# Patient Record
Sex: MALE | Race: WHITE | Employment: OTHER | URBAN - METROPOLITAN AREA
[De-identification: names, ages, dates, MRNs, and addresses within clinical notes are randomized per-mention and may not be internally consistent; named-entity substitution may affect disease eponyms.]

---

## 2017-08-08 ENCOUNTER — HOSPITAL ENCOUNTER (OUTPATIENT)
Dept: CT IMAGING | Age: 82
Discharge: HOME | End: 2017-08-08
Attending: NURSE PRACTITIONER
Payer: MEDICARE

## 2017-08-08 DIAGNOSIS — R10.12 ABDOMINAL PAIN, LEFT UPPER QUADRANT: ICD-10-CM

## 2017-08-08 DIAGNOSIS — R53.83 FATIGUE: ICD-10-CM

## 2017-08-08 LAB — CREAT BLD-MCNC: 1.2 MG/DL (ref 0.6–1.3)

## 2017-08-08 PROCEDURE — 74160 CT ABDOMEN W/CONTRAST: CPT

## 2017-08-08 PROCEDURE — 74011636320 HC RX REV CODE- 636/320: Performed by: RADIOLOGY

## 2017-08-08 PROCEDURE — 82565 ASSAY OF CREATININE: CPT

## 2017-08-08 PROCEDURE — 74011250636 HC RX REV CODE- 250/636: Performed by: RADIOLOGY

## 2017-08-08 RX ORDER — SODIUM CHLORIDE 9 MG/ML
50 INJECTION, SOLUTION INTRAVENOUS
Status: COMPLETED | OUTPATIENT
Start: 2017-08-08 | End: 2017-08-08

## 2017-08-08 RX ORDER — SODIUM CHLORIDE 0.9 % (FLUSH) 0.9 %
10 SYRINGE (ML) INJECTION
Status: COMPLETED | OUTPATIENT
Start: 2017-08-08 | End: 2017-08-08

## 2017-08-08 RX ADMIN — Medication 10 ML: at 14:26

## 2017-08-08 RX ADMIN — IOPAMIDOL 100 ML: 755 INJECTION, SOLUTION INTRAVENOUS at 14:26

## 2017-08-08 RX ADMIN — SODIUM CHLORIDE 50 ML/HR: 900 INJECTION, SOLUTION INTRAVENOUS at 14:26

## 2018-12-05 ENCOUNTER — OFFICE VISIT (OUTPATIENT)
Dept: NEUROLOGY | Age: 83
End: 2018-12-05

## 2018-12-05 VITALS
SYSTOLIC BLOOD PRESSURE: 142 MMHG | HEIGHT: 70 IN | DIASTOLIC BLOOD PRESSURE: 78 MMHG | OXYGEN SATURATION: 98 % | WEIGHT: 200 LBS | HEART RATE: 68 BPM | BODY MASS INDEX: 28.63 KG/M2 | RESPIRATION RATE: 18 BRPM

## 2018-12-05 DIAGNOSIS — G95.19 NEUROGENIC CLAUDICATION (HCC): Primary | ICD-10-CM

## 2018-12-05 DIAGNOSIS — Z87.81 HISTORY OF COMPRESSION FRACTURE OF SPINE: ICD-10-CM

## 2018-12-05 DIAGNOSIS — R20.2 TINGLING OF BOTH UPPER EXTREMITIES: ICD-10-CM

## 2018-12-05 RX ORDER — FINASTERIDE 5 MG/1
5 TABLET, FILM COATED ORAL DAILY
Refills: 0 | COMMUNITY
Start: 2018-10-01 | End: 2019-06-19

## 2018-12-05 RX ORDER — SILODOSIN 8 MG/1
8 CAPSULE ORAL
Refills: 0 | COMMUNITY
Start: 2018-10-01 | End: 2019-10-17

## 2018-12-05 NOTE — LETTER
12/5/2018 Patient:  Sotero Martinez YOB: 1934 Date of Visit: 12/5/2018 Dear Bryson Joseph MD 
[address] [place] [address] VIA Facsimile: [phone] Josh Eron I was requested by Wendie Webster MD to evaluate Mr. Sotero Martinez  for Chief Complaint Patient presents with  Extremity Weakness  
  both legs Ana Hudson I am recommending the following:  
 
Diagnoses and all orders for this visit: 1. Neurogenic claudication -     MRI LUMB SPINE WO CONT; Future 2. Tingling of both upper extremities -     MRI CERV SPINE WO CONT; Future -     MRI [facility] SPINE WO CONT; Future 3. History of compression fracture of spine -     MRI CERV SPINE WO CONT; Future -     MRI [facility] SPINE WO CONT; Future -     MRI LUMB SPINE WO CONT; Future 
 
 
 
---------------------------------------------------------------------------------------------------------------------- Below is my encounter: Chief Complaint Patient presents with  Extremity Weakness  
  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 He tells me that as far back as 2012 he has had some episodes of bilateral leg weakness and falls subsequently. His last event was last week. He went on a long 2 mi walk and about senior care through he began to have progressive heaviness of both legs symmetrically without numbness. Eventually he lost strength and fell. He needed help. NO arm weakness. This  Has happened maybe 5 times. In 2012 he had a fall while handling his dog and actually fell backwards and developed a T3 fracture that did not need intervention. He has some mild low back pain but no radicular symptoms. Complaints of upper extremity numbness and tingling distally of both hands. No vision changes or weakness.   X-rays of the T-spine and L-spine with abnormality in 2012. He walks his dog daily and does yoga. Review of Systems Constitutional: Positive for malaise/fatigue. Musculoskeletal: Positive for back pain, falls and neck pain. Neurological: Positive for tingling and focal weakness. Negative for sensory change and speech change. All other systems reviewed and are negative. Past Medical History:  
Diagnosis Date  BPH (benign prostatic hyperplasia)  Compression fracture of thoracic vertebra (HCC) T3  
 ED (erectile dysfunction)  GERD (gastroesophageal reflux disease)  Hiatal hernia  Hypersomnia  Osteopenia  Schatzki's ring  Sun-damaged skin  Sunburn, blistering Family History Problem Relation Age of Onset  Heart Disease Father  Heart Attack Father  Liver Disease Mother Social History Socioeconomic History  Marital status:  Spouse name: Not on file  Number of children: Not on file  Years of education: Not on file  Highest education level: Not on file Social Needs  Financial resource strain: Not on file  Food insecurity - worry: Not on file  Food insecurity - inability: Not on file  Transportation needs - medical: Not on file  Transportation needs - non-medical: Not on file Occupational History  Not on file Tobacco Use  Smoking status: Former Smoker Last attempt to quit: 1980 Years since quittin.9  Smokeless tobacco: Never Used Substance and Sexual Activity  Alcohol use: Yes Alcohol/week: 4.2 oz Types: 7 Shots of liquor per week  Drug use: Not on file  Sexual activity: Not on file Other Topics Concern  Not on file Social History Narrative  Not on file Current Outpatient Medications Medication Sig  
 finasteride (PROSCAR) 5 mg tablet  RAPAFLO 8 mg capsule  multivitamin (ONE A DAY) tablet Take 1 Tab by mouth daily.  GABAPENTIN PO Take  by mouth.  dutasteride (AVODART) 0.5 mg capsule Take 0.5 mg by mouth daily.  bupivacaine-EPINEPHrine (MARCAINE-EPINEPHRINE) 0.25 %-1:200,000 soln Used for Mohs surgery.  OTHER Pt cant remember name of medication prescribed for urinary difficulties, pt states it is like flomax.  carisoprodol (SOMA) 350 mg tablet Take 1 Tab by mouth four (4) times daily. No current facility-administered medications for this visit. Allergies Allergen Reactions  Bactrim [Sulfamethoprim Ds] Rash Neurologic Exam  
 
Mental Status Oriented to person, place, and time. Cranial Nerves Cranial nerves II through XII intact. Motor Exam  
Muscle bulk: normal 
 
Strength Strength 5/5 throughout. No atrophy or fasciculations Sensory Exam  
Light touch normal.  
 
Gait, Coordination, and Reflexes Gait Gait: normal 
 
Coordination Romberg: negative Tremor Resting tremor: absentUpper extremities 3/4 Lower extremities patella 2/4, Achilles 1/4, mute toes Physical Exam  
Constitutional: He is oriented to person, place, and time. He appears well-developed and well-nourished. Cardiovascular: Normal rate. Pulmonary/Chest: Effort normal.  
Neurological: He is oriented to person, place, and time. He has normal strength. He has a normal Romberg Test. Gait normal.  
Skin: Skin is warm and dry. Psychiatric: He has a normal mood and affect. His behavior is normal.  
Vitals reviewed. Visit Vitals /78 Pulse 68 Resp 18 Ht 5' 10\" (1.778 m) Wt 90.7 kg (200 lb) SpO2 98% BMI 28.70 kg/m² No results found for: WBC, WBCT, WBCPOC, HGB, HGBPOC, HCT, HCTPOC, PLT, PLTPOC, MCV, MCVPOC, HGBEXT, HCTEXT, PLTEXT No results found for: GPT, ALTPOC, ALT, SGOT, ASTPOC, GGT, AP, APIT, APX, CBIL, TBIL, TBILI, ALB, ALBPOC, TP, NH3, NH4, INR, PTP, PTINR, PTEXT, PLT, PLTPOC, HCABQL, HBSAG, AFP, PTEXT, PLTEXT 
  
 
CT Results (maximum last 3): Results from Hospital Encounter encounter on 08/08/17 CT ABD W CONT Narrative EXAM:  CT ABD W CONT INDICATION:   Abdominal pain, left upper quadrant,, Fatigue COMPARISON: None. CONTRAST:  100 mL of Isovue-370. TECHNIQUE:  
Multislice helical CT was performed from the diaphragm to the iliac crest during 
uneventful rapid bolus intravenous contrast administration. Oral contrast was 
administered. Contiguous 5 mm axial images were reconstructed and lung and soft 
tissue windows were generated. Coronal and sagittal reformations were generated. CT dose reduction was achieved through use of a standardized protocol tailored 
for this examination and automatic exposure control for dose modulation. FINDINGS: 
Liver and spleen are normal in size. The gallbladder is not distended, the 
pancreas appears unremarkable. Adrenals appear normal. Kidneys are unobstructed 
with some focal atrophy on the right and a cyst on the left. No definite 
calcifications. There is no bowel wall thickening or obstruction. Minimal 
calcification of the abdominal aorta. No free air or free fluid. The appendix 
appears normal. 
  
 Impression  impression: No significant abnormalities. MRI Results (maximum last 3): No results found for this or any previous visit. PET Results (maximum last 3): No results found for this or any previous visit. Assessment and Plan Diagnoses and all orders for this visit: 1. Neurogenic claudication -     MRI LUMB SPINE WO CONT; Future 2. Tingling of both upper extremities -     MRI CERV SPINE WO CONT; Future -     MRI [facility] SPINE WO CONT; Future 3. History of compression fracture of spine -     MRI CERV SPINE WO CONT; Future -     MRI [facility] SPINE WO CONT; Future -     MRI LUMB SPINE WO CONT; Future 80-year-old gentleman who has been having intermittent falls in the last few years preceded by a subacute progressive bilateral lower extremity weakness.   Based on the history of falls and abnormal x-rays I suspect this is likely peripheral.  It sounds like he has neurogenic claudication in his low back and after excessive walking in an upright posture with possibly some hyperextension of the low back he develops polyradicular symptoms. He does have a history of a T-spine fracture and resultant neck pain. I wonder if he has similar findings in the thoracic and cervical spine. On exam he is hyperreflexic in the upper extremities more than lower extremities with mute toes. I do think we need to conduct spine imaging throughout completely to r/o compressive process, etc.  I will order the studies. Depending on the results we may send him to a spine specialist for an opinion. Continue with activities as tolerated. Any unusual symptoms please let me know or come to the hospital. 
 
 
Thank you for giving me the opportunity to assist in the care of Mr. Jaspla Petty. If you have questions, please do not hesitate to contact me. Sincerely, 2 [facility], DO Neurologist 
Diplomate ENE

## 2018-12-05 NOTE — PATIENT INSTRUCTIONS
PRESCRIPTION REFILL POLICY [facility] Statement to Patients April 1, 2014 In an effort to ensure the large volume of patient prescription refills is processed in the most efficient and expeditious manner, we are asking our patients to assist us by calling your Pharmacy for all prescription refills, this will include also your  Mail Order Pharmacy. The pharmacy will contact our office electronically to continue the refill process. Please do not wait until the last minute to call your pharmacy. We need at least 48 hours (2days) to fill prescriptions. We also encourage you to call your pharmacy before going to  your prescription to make sure it is ready. With regard to controlled substance prescription refill requests (narcotic refills) that need to be picked up at our office, we ask your cooperation by providing us with at least 72 hours (3days) notice that you will need a refill. We will not refill narcotic prescription refill requests after 4:00pm on any weekday, Monday through Thursday, or after 2:00pm on Fridays, or on the weekends. We encourage everyone to explore another way of getting your prescription refill request processed using [employer], our patient web portal through our electronic medical record system. [employer] is an efficient and effective way to communicate your medication request directly to the office and  downloadable as an brayden on your smart phone . [employer] also features a review functionality that allows you to view your medication list as well as leave messages for your physician. Are you ready to get connected? If so please review the attatched instructions or speak to any of our staff to get you set up right away! Thank you so much for your cooperation. Should you have any questions please contact our Practice Administrator. The Physicians and Staff,  [facility] A Healthy Lifestyle: Care Instructions Your Care Instructions A healthy lifestyle can help you feel good, stay at a healthy weight, and have plenty of energy for both work and play. A healthy lifestyle is something you can share with your whole family. A healthy lifestyle also can lower your risk for serious health problems, such as high blood pressure, heart disease, and diabetes. You can follow a few steps listed below to improve your health and the health of your family. Follow-up care is a key part of your treatment and safety. Be sure to make and go to all appointments, and call your doctor if you are having problems. It's also a good idea to know your test results and keep a list of the medicines you take. How can you care for yourself at home? · Do not eat too much sugar, fat, or fast foods. You can still have dessert and treats now and then. The goal is moderation. · Start small to improve your eating habits. Pay attention to portion sizes, drink less juice and soda pop, and eat more fruits and vegetables. ? Eat a healthy amount of food. A 3-ounce serving of meat, for example, is about the size of a deck of cards. Fill the rest of your plate with vegetables and whole grains. ? Limit the amount of soda and sports drinks you have every day. Drink more water when you are thirsty. ? Eat at least 5 servings of fruits and vegetables every day. It may seem like a lot, but it is not hard to reach this goal. A serving or helping is 1 piece of fruit, 1 cup of vegetables, or 2 cups of leafy, raw vegetables. Have an apple or some carrot sticks as an afternoon snack instead of a candy bar. Try to have fruits and/or vegetables at every meal. 
· Make exercise part of your daily routine. You may want to start with simple activities, such as walking, bicycling, or slow swimming. Try to be active 30 to 60 minutes every day. You do not need to do all 30 to 60 minutes all at once.  For example, you can exercise 3 times a day for 10 or 20 minutes. Moderate exercise is safe for most people, but it is always a good idea to talk to your doctor before starting an exercise program. 
· Keep moving. Salvatore Memos the lawn, work in the garden, or [employer]. Take the stairs instead of the elevator at work. · If you smoke, quit. People who smoke have an increased risk for heart attack, stroke, cancer, and other lung illnesses. Quitting is hard, but there are ways to boost your chance of quitting tobacco for good. ? Use nicotine gum, patches, or lozenges. ? Ask your doctor about stop-smoking programs and medicines. ? Keep trying. In addition to reducing your risk of diseases in the future, you will notice some benefits soon after you stop using tobacco. If you have shortness of breath or asthma symptoms, they will likely get better within a few weeks after you quit. · Limit how much alcohol you drink. Moderate amounts of alcohol (up to 2 drinks a day for men, 1 drink a day for women) are okay. But drinking too much can lead to liver problems, high blood pressure, and other health problems. Family health If you have a family, there are many things you can do together to improve your health. · Eat meals together as a family as often as possible. · Eat healthy foods. This includes fruits, vegetables, lean meats and dairy, and whole grains. · Include your family in your fitness plan. Most people think of activities such as jogging or tennis as the way to fitness, but there are many ways you and your family can be more active. Anything that makes you breathe hard and gets your heart pumping is exercise. Here are some tips: 
? Walk to do errands or to take your child to school or the bus. 
? Go for a family bike ride after dinner instead of watching TV. Where can you learn more? Go to http://fernando-florencio.info/. Enter G944 in the search box to learn more about \"A Healthy Lifestyle: Care Instructions. \" Current as of: December 7, 2017 Content Version: 11.8 © 0482-5902 [employer]. Care instructions adapted under license by [employer] (which disclaims liability or warranty for this information). If you have questions about a medical condition or this instruction, always ask your healthcare professional. [address] any warranty or liability for your use of this information.

## 2018-12-05 NOTE — PROGRESS NOTES
Chief Complaint Patient presents with  Extremity Weakness  
  both legs Referred by: Dr. 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 He tells me that as far back as 2012 he has had some episodes of bilateral leg weakness and falls subsequently. His last event was last week. He went on a long 2 mi walk and about jail through he began to have progressive heaviness of both legs symmetrically without numbness. Eventually he lost strength and fell. He needed help. NO arm weakness. This  Has happened maybe 5 times. In 2012 he had a fall while handling his dog and actually fell backwards and developed a T3 fracture that did not need intervention. He has some mild low back pain but no radicular symptoms. Complaints of upper extremity numbness and tingling distally of both hands. No vision changes or weakness. X-rays of the T-spine and L-spine with abnormality in 2012. He walks his dog daily and does yoga. Review of Systems Constitutional: Positive for malaise/fatigue. Musculoskeletal: Positive for back pain, falls and neck pain. Neurological: Positive for tingling and focal weakness. Negative for sensory change and speech change. All other systems reviewed and are negative. Past Medical History:  
Diagnosis Date  BPH (benign prostatic hyperplasia)  Compression fracture of thoracic vertebra (HCC) T3  
 ED (erectile dysfunction)  GERD (gastroesophageal reflux disease)  Hiatal hernia  Hypersomnia  Osteopenia  Schatzki's ring  Sun-damaged skin  Sunburn, blistering Family History Problem Relation Age of Onset  Heart Disease Father  Heart Attack Father  Liver Disease Mother Social History Socioeconomic History  Marital status:  Spouse name: Not on file  Number of children: Not on file  Years of education: Not on file  Highest education level: Not on file Social Needs  Financial resource strain: Not on file  Food insecurity - worry: Not on file  Food insecurity - inability: Not on file  Transportation needs - medical: Not on file  Transportation needs - non-medical: Not on file Occupational History  Not on file Tobacco Use  Smoking status: Former Smoker Last attempt to quit: 1980 Years since quittin.9  Smokeless tobacco: Never Used Substance and Sexual Activity  Alcohol use: Yes Alcohol/week: 4.2 oz Types: 7 Shots of liquor per week  Drug use: Not on file  Sexual activity: Not on file Other Topics Concern  Not on file Social History Narrative  Not on file Current Outpatient Medications Medication Sig  
 finasteride (PROSCAR) 5 mg tablet  RAPAFLO 8 mg capsule  multivitamin (ONE A DAY) tablet Take 1 Tab by mouth daily.  GABAPENTIN PO Take  by mouth.  dutasteride (AVODART) 0.5 mg capsule Take 0.5 mg by mouth daily.  bupivacaine-EPINEPHrine (MARCAINE-EPINEPHRINE) 0.25 %-1:200,000 soln Used for Mohs surgery.  OTHER Pt cant remember name of medication prescribed for urinary difficulties, pt states it is like flomax.  carisoprodol (SOMA) 350 mg tablet Take 1 Tab by mouth four (4) times daily. No current facility-administered medications for this visit. Allergies Allergen Reactions  Bactrim [Sulfamethoprim Ds] Rash Neurologic Exam  
 
Mental Status Oriented to person, place, and time. Cranial Nerves Cranial nerves II through XII intact. Motor Exam  
Muscle bulk: normal 
 
Strength Strength 5/5 throughout. No atrophy or fasciculations Sensory Exam  
Light touch normal.  
 
Gait, Coordination, and Reflexes Gait Gait: normal 
 
Coordination Romberg: negative Tremor Resting tremor: absentUpper extremities 3/4 Lower extremities patella 2/4, Achilles 1/4, mute toes Physical Exam  
 Constitutional: He is oriented to person, place, and time. He appears well-developed and well-nourished. Cardiovascular: Normal rate. Pulmonary/Chest: Effort normal.  
Neurological: He is oriented to person, place, and time. He has normal strength. He has a normal Romberg Test. Gait normal.  
Skin: Skin is warm and dry. Psychiatric: He has a normal mood and affect. His behavior is normal.  
Vitals reviewed. Visit Vitals /78 Pulse 68 Resp 18 Ht 5' 10\" (1.778 m) Wt 90.7 kg (200 lb) SpO2 98% BMI 28.70 kg/m² No results found for: WBC, WBCT, WBCPOC, HGB, HGBPOC, HCT, HCTPOC, PLT, PLTPOC, MCV, MCVPOC, HGBEXT, HCTEXT, PLTEXT No results found for: GPT, ALTPOC, ALT, SGOT, ASTPOC, GGT, AP, APIT, APX, CBIL, TBIL, TBILI, ALB, ALBPOC, TP, NH3, NH4, INR, PTP, PTINR, PTEXT, PLT, PLTPOC, HCABQL, HBSAG, AFP, PTEXT, PLTEXT 
  
 
CT Results (maximum last 3): Results from Hospital Encounter encounter on 08/08/17 CT ABD W CONT Narrative EXAM:  CT ABD W CONT INDICATION:   Abdominal pain, left upper quadrant,, Fatigue COMPARISON: None. CONTRAST:  100 mL of Isovue-370. TECHNIQUE:  
Multislice helical CT was performed from the diaphragm to the iliac crest during 
uneventful rapid bolus intravenous contrast administration. Oral contrast was 
administered. Contiguous 5 mm axial images were reconstructed and lung and soft 
tissue windows were generated. Coronal and sagittal reformations were generated. CT dose reduction was achieved through use of a standardized protocol tailored 
for this examination and automatic exposure control for dose modulation. FINDINGS: 
Liver and spleen are normal in size. The gallbladder is not distended, the 
pancreas appears unremarkable. Adrenals appear normal. Kidneys are unobstructed 
with some focal atrophy on the right and a cyst on the left. No definite 
calcifications. There is no bowel wall thickening or obstruction.  Minimal 
 calcification of the abdominal aorta. No free air or free fluid. The appendix 
appears normal. 
  
 Impression  impression: No significant abnormalities. MRI Results (maximum last 3): No results found for this or any previous visit. PET Results (maximum last 3): No results found for this or any previous visit. Assessment and Plan Diagnoses and all orders for this visit: 1. Neurogenic claudication -     MRI LUMB SPINE WO CONT; Future 2. Tingling of both upper extremities -     MRI CERV SPINE WO CONT; Future -     MRI [facility] SPINE WO CONT; Future 3. History of compression fracture of spine -     MRI CERV SPINE WO CONT; Future -     MRI [facility] SPINE WO CONT; Future -     MRI LUMB SPINE WO CONT; Future 60-year-old gentleman who has been having intermittent falls in the last few years preceded by a subacute progressive bilateral lower extremity weakness. Based on the history of falls and abnormal x-rays I suspect this is likely peripheral.  It sounds like he has neurogenic claudication in his low back and after excessive walking in an upright posture with possibly some hyperextension of the low back he develops polyradicular symptoms. He does have a history of a T-spine fracture and resultant neck pain. I wonder if he has similar findings in the thoracic and cervical spine. On exam he is hyperreflexic in the upper extremities more than lower extremities with mute toes. I do think we need to conduct spine imaging throughout completely to r/o compressive process, etc.  I will order the studies. Depending on the results we may send him to a spine specialist for an opinion. Continue with activities as tolerated. Any unusual symptoms please let me know or come to the hospital. 
 
 
 
 
A notice of this visit/encounter being completed has been sent electronically to the patient's PCP and/or referring provider. 2 [facility],  
NEUROLOGIST Diplomate ENE

## 2018-12-05 NOTE — PROGRESS NOTES
New patient here for evaluation of bilateral leg weakness. Symptoms started about 5 years ago. His wife has reported 3 episodes in the last 5 years.

## 2018-12-18 ENCOUNTER — HOSPITAL ENCOUNTER (OUTPATIENT)
Dept: MRI IMAGING | Age: 83
Discharge: HOME | End: 2018-12-18
Attending: PSYCHIATRY & NEUROLOGY
Payer: MEDICARE

## 2018-12-18 DIAGNOSIS — Z87.81 HISTORY OF COMPRESSION FRACTURE OF SPINE: ICD-10-CM

## 2018-12-18 DIAGNOSIS — R20.2 TINGLING OF BOTH UPPER EXTREMITIES: ICD-10-CM

## 2018-12-18 DIAGNOSIS — G95.19 NEUROGENIC CLAUDICATION (HCC): ICD-10-CM

## 2018-12-18 PROCEDURE — 72141 MRI NECK SPINE W/O DYE: CPT

## 2018-12-18 PROCEDURE — 72148 MRI LUMBAR SPINE W/O DYE: CPT

## 2018-12-18 PROCEDURE — 72146 MRI CHEST SPINE W/O DYE: CPT

## 2018-12-19 ENCOUNTER — TELEPHONE (OUTPATIENT)
Dept: NEUROLOGY | Age: 83
End: 2018-12-19

## 2018-12-19 DIAGNOSIS — M51.37 DDD (DEGENERATIVE DISC DISEASE), LUMBOSACRAL: ICD-10-CM

## 2018-12-19 DIAGNOSIS — M48.02 NEUROFORAMINAL STENOSIS OF CERVICAL SPINE: Primary | ICD-10-CM

## 2018-12-19 NOTE — TELEPHONE ENCOUNTER
I reviewed his imaging done. The old thoracic spine fracture at T3 is again seen. Looking at his neck and low back he has a lot of arthritic and degenerative changes which could be causing a lot of his symptoms. I am going to send him to Dr. Caryle Ladd who is an orthopedic spine surgeon for an opinion.

## 2019-05-24 PROBLEM — H25.811 COMBINED FORMS OF AGE-RELATED CATARACT OF RIGHT EYE: Status: ACTIVE | Noted: 2019-05-24

## 2019-05-28 ENCOUNTER — ANESTHESIA EVENT (OUTPATIENT)
Dept: SURGERY | Age: 84
End: 2019-05-28
Payer: MEDICARE

## 2019-05-28 NOTE — PERIOP NOTES
[facility]  Ambulatory Surgery Unit  Pre-operative Instructions    Surgery/Procedure Date  Wednesday, May 29, 2019            Tentative Arrival Time TBD      1. On the day of your surgery/procedure, please report to the Ambulatory Surgery Unit Registration Desk and sign in at your designated time. The Ambulatory Surgery Unit is located in [facility] on the [facility] side of the [facility] across from the Critical access hospital. Please have all of your health insurance cards and a photo ID. 2. You must have someone with you to drive you home, as you should not drive a car for 24 hours following anesthesia. Please make arrangements for a responsible adult friend or family member to stay with you for at least the first 24 hours after your surgery. 3. Do not have anything to eat or drink (including water, gum, mints, coffee, juice) after 11:59 PM, Tuesday. This may not apply to medications prescribed by your physician. (Please note below the special instructions with medications to take the morning of surgery, if applicable.)    4. We recommend you do not drink any alcoholic beverages for 24 hours before and after your surgery. 5. Contact your surgeons office for instructions on the following medications: non-steroidal anti-inflammatory drugs (i.e. Advil, Aleve), vitamins, and supplements. (Some surgeons will want you to stop these medications prior to surgery and others may allow you to take them)   **If you are currently taking Plavix, Coumadin, Aspirin and/or other blood-thinning agents, contact your surgeon for instructions. ** Your surgeon will partner with the physician prescribing these medications to determine if it is safe to stop or if you need to continue taking. Please do not stop taking these medications without instructions from your surgeon.     6. In an effort to help prevent surgical site infection, we ask that you shower with an anti-bacterial soap (i.e. Dial/Safeguard, or the soap provided to you at your preadmission testing appointment) for 3 days prior to and on the morning of surgery, using a fresh towel after each shower. (Please begin this process with fresh bed linens.) Do not apply any lotions, powders, or deodorants after the shower on the day of your procedure. If applicable, please do not shave the operative site for 48 hours prior to surgery. 7. Wear comfortable clothes. Wear glasses instead of contacts. Do not bring any jewelry or money (other than copays or fees as instructed). Do not wear make-up, particularly mascara, the morning of your surgery. Do not wear nail polish, particularly if you are having foot /hand surgery. Wear your hair loose or down, no ponytails, buns, kathy pins or clips. All body piercings must be removed. 8. You should understand that if you do not follow these instructions your surgery may be cancelled. If your physical condition changes (i.e. fever, cold or flu) please contact your surgeon as soon as possible. 9. It is important that you be on time. If a situation occurs where you may be late, or if you have any questions or problems, please call [phone].    10. Your surgery time may be subject to change. You will receive a phone call the day prior to surgery to confirm your arrival time. 11. Pediatric patients: please bring a change of clothes, diapers, bottle/sippy cup, pacifier, etc.      Special Instructions: Take all medications and inhalers, as prescribed, on the morning of surgery with a sip of water. I understand a pre-operative phone call will be made to verify my surgery time. In the event that I am not available, I give permission for a message to be left on my answering service and/or with another person?       yes    Preop instructions reviewed  Pt verbalized understanding.      ___________________      ___________________      ________________  (Signature of Patient)          (Witness) (Date and Time)

## 2019-05-29 ENCOUNTER — ANESTHESIA (OUTPATIENT)
Dept: SURGERY | Age: 84
End: 2019-05-29
Payer: MEDICARE

## 2019-05-29 ENCOUNTER — HOSPITAL ENCOUNTER (OUTPATIENT)
Age: 84
Setting detail: OUTPATIENT SURGERY
Discharge: HOME | End: 2019-05-29
Attending: OPHTHALMOLOGY | Admitting: OPHTHALMOLOGY
Payer: MEDICARE

## 2019-05-29 VITALS
HEART RATE: 57 BPM | HEIGHT: 70 IN | RESPIRATION RATE: 16 BRPM | SYSTOLIC BLOOD PRESSURE: 158 MMHG | TEMPERATURE: 98.2 F | BODY MASS INDEX: 27.92 KG/M2 | DIASTOLIC BLOOD PRESSURE: 74 MMHG | WEIGHT: 195 LBS | OXYGEN SATURATION: 96 %

## 2019-05-29 PROCEDURE — V2632 POST CHMBR INTRAOCULAR LENS: HCPCS | Performed by: OPHTHALMOLOGY

## 2019-05-29 PROCEDURE — 74011250636 HC RX REV CODE- 250/636

## 2019-05-29 PROCEDURE — 74011250636 HC RX REV CODE- 250/636: Performed by: ANESTHESIOLOGY

## 2019-05-29 PROCEDURE — 77030018846 HC SOL IRR STRL H20 ICUM -A: Performed by: OPHTHALMOLOGY

## 2019-05-29 PROCEDURE — 76210000046 HC AMBSU PH II REC FIRST 0.5 HR: Performed by: OPHTHALMOLOGY

## 2019-05-29 PROCEDURE — 76030000002 HC AMB SURG OR TIME FIRST 0.: Performed by: OPHTHALMOLOGY

## 2019-05-29 PROCEDURE — 74011250637 HC RX REV CODE- 250/637: Performed by: OPHTHALMOLOGY

## 2019-05-29 PROCEDURE — 74011000250 HC RX REV CODE- 250: Performed by: OPHTHALMOLOGY

## 2019-05-29 PROCEDURE — 74011000250 HC RX REV CODE- 250

## 2019-05-29 PROCEDURE — V2787 ASTIGMATISM-CORRECT FUNCTION: HCPCS | Performed by: OPHTHALMOLOGY

## 2019-05-29 PROCEDURE — 76060000073 HC AMB SURG ANES FIRST 0.5 HR: Performed by: OPHTHALMOLOGY

## 2019-05-29 PROCEDURE — 74011250636 HC RX REV CODE- 250/636: Performed by: OPHTHALMOLOGY

## 2019-05-29 PROCEDURE — 77030021352 HC CBL LD SYS DISP COVD -B: Performed by: OPHTHALMOLOGY

## 2019-05-29 RX ORDER — ERYTHROMYCIN 5 MG/G
OINTMENT OPHTHALMIC
Status: COMPLETED | OUTPATIENT
Start: 2019-05-29 | End: 2019-05-29

## 2019-05-29 RX ORDER — SODIUM CHLORIDE 0.9 % (FLUSH) 0.9 %
5-40 SYRINGE (ML) INJECTION EVERY 8 HOURS
Status: DISCONTINUED | OUTPATIENT
Start: 2019-05-29 | End: 2019-05-29 | Stop reason: HOSPADM

## 2019-05-29 RX ORDER — CYCLOPENTOLATE HYDROCHLORIDE 20 MG/ML
1 SOLUTION/ DROPS OPHTHALMIC
Status: COMPLETED | OUTPATIENT
Start: 2019-05-29 | End: 2019-05-29

## 2019-05-29 RX ORDER — KETOROLAC TROMETHAMINE 5 MG/ML
1 SOLUTION OPHTHALMIC
Status: COMPLETED | OUTPATIENT
Start: 2019-05-29 | End: 2019-05-29

## 2019-05-29 RX ORDER — PROPARACAINE HYDROCHLORIDE 5 MG/ML
1 SOLUTION/ DROPS OPHTHALMIC
Status: COMPLETED | OUTPATIENT
Start: 2019-05-29 | End: 2019-05-29

## 2019-05-29 RX ORDER — LIDOCAINE HYDROCHLORIDE 10 MG/ML
0.1 INJECTION, SOLUTION EPIDURAL; INFILTRATION; INTRACAUDAL; PERINEURAL AS NEEDED
Status: DISCONTINUED | OUTPATIENT
Start: 2019-05-29 | End: 2019-05-29 | Stop reason: HOSPADM

## 2019-05-29 RX ORDER — FENTANYL CITRATE 50 UG/ML
25 INJECTION, SOLUTION INTRAMUSCULAR; INTRAVENOUS
Status: DISCONTINUED | OUTPATIENT
Start: 2019-05-29 | End: 2019-05-29 | Stop reason: HOSPADM

## 2019-05-29 RX ORDER — SODIUM CHLORIDE 0.9 % (FLUSH) 0.9 %
5-40 SYRINGE (ML) INJECTION AS NEEDED
Status: DISCONTINUED | OUTPATIENT
Start: 2019-05-29 | End: 2019-05-29 | Stop reason: HOSPADM

## 2019-05-29 RX ORDER — FENTANYL CITRATE 50 UG/ML
INJECTION, SOLUTION INTRAMUSCULAR; INTRAVENOUS AS NEEDED
Status: DISCONTINUED | OUTPATIENT
Start: 2019-05-29 | End: 2019-05-29 | Stop reason: HOSPADM

## 2019-05-29 RX ORDER — KETOROLAC TROMETHAMINE 5 MG/ML
SOLUTION OPHTHALMIC
Status: COMPLETED
Start: 2019-05-29 | End: 2019-05-29

## 2019-05-29 RX ORDER — CYCLOPENTOLATE HYDROCHLORIDE 20 MG/ML
SOLUTION/ DROPS OPHTHALMIC
Status: COMPLETED
Start: 2019-05-29 | End: 2019-05-29

## 2019-05-29 RX ORDER — DIPHENHYDRAMINE HYDROCHLORIDE 50 MG/ML
12.5 INJECTION, SOLUTION INTRAMUSCULAR; INTRAVENOUS AS NEEDED
Status: DISCONTINUED | OUTPATIENT
Start: 2019-05-29 | End: 2019-05-29 | Stop reason: HOSPADM

## 2019-05-29 RX ORDER — ONDANSETRON 2 MG/ML
4 INJECTION INTRAMUSCULAR; INTRAVENOUS AS NEEDED
Status: DISCONTINUED | OUTPATIENT
Start: 2019-05-29 | End: 2019-05-29 | Stop reason: HOSPADM

## 2019-05-29 RX ORDER — SODIUM CHLORIDE, SODIUM LACTATE, POTASSIUM CHLORIDE, CALCIUM CHLORIDE 600; 310; 30; 20 MG/100ML; MG/100ML; MG/100ML; MG/100ML
25 INJECTION, SOLUTION INTRAVENOUS CONTINUOUS
Status: DISCONTINUED | OUTPATIENT
Start: 2019-05-29 | End: 2019-05-29 | Stop reason: HOSPADM

## 2019-05-29 RX ORDER — ONDANSETRON 2 MG/ML
INJECTION INTRAMUSCULAR; INTRAVENOUS AS NEEDED
Status: DISCONTINUED | OUTPATIENT
Start: 2019-05-29 | End: 2019-05-29 | Stop reason: HOSPADM

## 2019-05-29 RX ORDER — SODIUM CHLORIDE 9 MG/ML
25 INJECTION, SOLUTION INTRAVENOUS CONTINUOUS
Status: DISCONTINUED | OUTPATIENT
Start: 2019-05-29 | End: 2019-05-29 | Stop reason: HOSPADM

## 2019-05-29 RX ORDER — LIDOCAINE HYDROCHLORIDE 40 MG/ML
3 INJECTION, SOLUTION RETROBULBAR; TOPICAL ONCE
Status: COMPLETED | OUTPATIENT
Start: 2019-05-29 | End: 2019-05-29

## 2019-05-29 RX ORDER — PHENYLEPHRINE HYDROCHLORIDE 25 MG/ML
1 SOLUTION/ DROPS OPHTHALMIC
Status: COMPLETED | OUTPATIENT
Start: 2019-05-29 | End: 2019-05-29

## 2019-05-29 RX ORDER — LIDOCAINE HYDROCHLORIDE 10 MG/ML
1 INJECTION, SOLUTION EPIDURAL; INFILTRATION; INTRACAUDAL; PERINEURAL ONCE
Status: COMPLETED | OUTPATIENT
Start: 2019-05-29 | End: 2019-05-29

## 2019-05-29 RX ORDER — PREDNISOLONE ACETATE 10 MG/ML
1 SUSPENSION/ DROPS OPHTHALMIC 2 TIMES DAILY
Status: DISCONTINUED | OUTPATIENT
Start: 2019-05-29 | End: 2019-05-29 | Stop reason: HOSPADM

## 2019-05-29 RX ADMIN — KETOROLAC TROMETHAMINE 1 DROP: 5 SOLUTION/ DROPS OPHTHALMIC at 08:49

## 2019-05-29 RX ADMIN — KETOROLAC TROMETHAMINE 1 DROP: 5 SOLUTION OPHTHALMIC at 09:04

## 2019-05-29 RX ADMIN — CYCLOPENTOLATE HYDROCHLORIDE 1 DROP: 20 SOLUTION/ DROPS OPHTHALMIC at 08:50

## 2019-05-29 RX ADMIN — PROPARACAINE HYDROCHLORIDE 1 DROP: 5 SOLUTION/ DROPS OPHTHALMIC at 09:00

## 2019-05-29 RX ADMIN — PHENYLEPHRINE HYDROCHLORIDE 1 DROP: 25 SOLUTION/ DROPS OPHTHALMIC at 08:50

## 2019-05-29 RX ADMIN — ONDANSETRON 4 MG: 2 INJECTION INTRAMUSCULAR; INTRAVENOUS at 10:23

## 2019-05-29 RX ADMIN — PROPARACAINE HYDROCHLORIDE 1 DROP: 5 SOLUTION/ DROPS OPHTHALMIC at 09:04

## 2019-05-29 RX ADMIN — PHENYLEPHRINE HYDROCHLORIDE 1 DROP: 25 SOLUTION/ DROPS OPHTHALMIC at 09:05

## 2019-05-29 RX ADMIN — CYCLOPENTOLATE HYDROCHLORIDE 1 DROP: 20 SOLUTION/ DROPS OPHTHALMIC at 08:59

## 2019-05-29 RX ADMIN — PROPARACAINE HYDROCHLORIDE 1 DROP: 5 SOLUTION/ DROPS OPHTHALMIC at 09:09

## 2019-05-29 RX ADMIN — CYCLOPENTOLATE HYDROCHLORIDE 1 DROP: 20 SOLUTION/ DROPS OPHTHALMIC at 08:55

## 2019-05-29 RX ADMIN — PROPARACAINE HYDROCHLORIDE 1 DROP: 5 SOLUTION/ DROPS OPHTHALMIC at 08:55

## 2019-05-29 RX ADMIN — KETOROLAC TROMETHAMINE 1 DROP: 5 SOLUTION OPHTHALMIC at 08:59

## 2019-05-29 RX ADMIN — PHENYLEPHRINE HYDROCHLORIDE 1 DROP: 25 SOLUTION/ DROPS OPHTHALMIC at 08:55

## 2019-05-29 RX ADMIN — PHENYLEPHRINE HYDROCHLORIDE 1 DROP: 25 SOLUTION/ DROPS OPHTHALMIC at 09:00

## 2019-05-29 RX ADMIN — KETOROLAC TROMETHAMINE 1 DROP: 5 SOLUTION OPHTHALMIC at 08:49

## 2019-05-29 RX ADMIN — LIDOCAINE HYDROCHLORIDE 0.1 ML: 10 INJECTION, SOLUTION EPIDURAL; INFILTRATION; INTRACAUDAL; PERINEURAL at 09:01

## 2019-05-29 RX ADMIN — CYCLOPENTOLATE HYDROCHLORIDE 1 DROP: 20 SOLUTION/ DROPS OPHTHALMIC at 09:05

## 2019-05-29 RX ADMIN — SODIUM CHLORIDE 25 ML/HR: 900 INJECTION, SOLUTION INTRAVENOUS at 09:01

## 2019-05-29 RX ADMIN — FENTANYL CITRATE 25 MCG: 50 INJECTION, SOLUTION INTRAMUSCULAR; INTRAVENOUS at 10:10

## 2019-05-29 RX ADMIN — FENTANYL CITRATE 25 MCG: 50 INJECTION, SOLUTION INTRAMUSCULAR; INTRAVENOUS at 10:00

## 2019-05-29 RX ADMIN — PROPARACAINE HYDROCHLORIDE 1 DROP: 5 SOLUTION/ DROPS OPHTHALMIC at 08:49

## 2019-05-29 RX ADMIN — KETOROLAC TROMETHAMINE 1 DROP: 5 SOLUTION OPHTHALMIC at 08:55

## 2019-05-29 NOTE — ANESTHESIA POSTPROCEDURE EVALUATION
Procedure(s):  CATARACT EXTRACTION WITH INTRA OCULAR LENS IMPLANT WITH IRIS HOOKS RIGHT 1ST. MAC    Anesthesia Post Evaluation      Multimodal analgesia: multimodal analgesia not used between 6 hours prior to anesthesia start to PACU discharge  Patient location during evaluation: bedside  Patient participation: complete - patient participated  Level of consciousness: awake and alert  Pain score: 0  Airway patency: patent  Anesthetic complications: no  Cardiovascular status: acceptable  Respiratory status: acceptable  Hydration status: acceptable  Post anesthesia nausea and vomiting:  none      No vitals data found for the desired time range.

## 2019-05-29 NOTE — PERIOP NOTES
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 Brent Gongora    Assistant(s): Circ-1: Tiffanie Sherman RN  Scrub Tech-1: Cesar Bueno  Scrub Tech-2: Nicki ANN    Specimens: * No specimens in log *    Assessment:  Intra-procedure medications         Anesthesia gave intra-procedure sedation and medications, see anesthesia flow sheet     Intravenous fluids: LR@ KVO     Vital signs stable       Recommendation:    Permission to share finding with wife : yes

## 2019-05-29 NOTE — ANESTHESIA PREPROCEDURE EVALUATION
Relevant Problems   No relevant active problems       Anesthetic History   No history of anesthetic complications            Review of Systems / Medical History  Patient summary reviewed, nursing notes reviewed and pertinent labs reviewed    Pulmonary  Within defined limits                 Neuro/Psych   Within defined limits          Comments: Vertebral compression fx T3 Cardiovascular  Within defined limits                Exercise tolerance: >4 METS     GI/Hepatic/Renal     GERD (occasional only)      Hiatal hernia    Comments: H/o Schatztki'sring Endo/Other        Arthritis     Other Findings              Physical Exam    Airway  Mallampati: III  TM Distance: 4 - 6 cm  Neck ROM: decreased range of motion   Mouth opening: Normal     Cardiovascular    Rhythm: regular  Rate: normal         Dental  No notable dental hx       Pulmonary  Breath sounds clear to auscultation               Abdominal  GI exam deferred       Other Findings            Anesthetic Plan    ASA: 2  Anesthesia type: MAC          Induction: Intravenous  Anesthetic plan and risks discussed with: Patient

## 2019-05-29 NOTE — DISCHARGE INSTRUCTIONS
Austen Marquez D.O., DEEP  [facility] [address].  [phone]    Post-Operative Instructions for Cataract Surgery     Remove your eye shield and bandage at 12 noon the same day as surgery and start your eye drops. THROW AWAY THE GAUZE UNDER THE SHIELD.  Place the blue eye shield back on for one week while asleep, even if napping in the recliner. Use the tape included in your bag.  DO NOT RUB YOUR EYE EVER! DO NOT WIPE YOUR EYE! ONLY WIPE ON YOUR CHEEK!                DO NOT WEAR EYE MAKEUP FOR 1 WEEK!  You can shower starting tomorrow.  You may resume your previous diet.  If you use glaucoma drops in the operative eye, continue them as directed.  Common symptoms after surgery include a scratchy feeling, slight headache, red eye, and/or blurred vision. You may use Advil or Tylenol for any discomfort.  Avoid strenuous activities and driving until you see Dr. Tye Marcano tomorrow. Please use care when walking, your depth perception may be altered.  Bring your bag with your drops to your follow up appointment. Below are Instructions On How To Use Your Eye Drops. ON THE DAY OF SURGERY:     Use all three eye drops at 12 noon, 4pm, and 8pm.  Wait 10 minutes between drops. THE DAY AFTER SURGERY:     You will use the drops 4 times a day at 8am, 12 noon, 4pm, and 8pm.   Use the drops every day until bottles are empty. Vigamox (tan top) Put 1 drop in at 8am, 12 noon, 4pm, and 8pm.    Pred Acetate (pink top) Put 1 drop in at 8:10am, 12:10pm, 4:10pm, and 8:10pm. Shake before using. Ketorolac/Diclofenac Put 1 drop in at 8:20am, 12:20pm, 4:20pm, 8:20pm.    CONTINUE DROPS UNTIL ALL BOTTLES ARE EMPTY! Follow-up appointment tomorrow at Dr. Karen Schumacher office:________8:45____________    Please call the office at [phone] if you experience severe pain or nausea.      The following personal items collected during your admission are returned to you:   Dental Appliance: Dental Appliances: None  Vision: Visual Aid: Glasses(given to wife)  Hearing Aid: @FLOW  DISCHARGE SUMMARY from Nurse  (1341:LAST)@  Jewelry: Jewelry: None  Clothing: Clothing: With patient  Other Valuables: Other Valuables: None  Valuables sent to safe:        PATIENT INSTRUCTIONS:    After General Anesthesia or Intravenous Sedation, for 24 hours or while taking prescription Narcotics:        Someone should be with you for the next 24 hours. For your own safety, a responsible adult must drive you home. · Limit your activities  · Recommended activity: Rest today, up with assistance today. Do not climb stairs or shower unattended for the next 24 hours. · Please start with a soft bland diet and advance as tolerated (no nausea) to regular diet. · If you have a sore throat you should try the following: fluids, warm salt water gargles, or throat lozenges. If it does not improve after several days please follow up with your primary physician. · Do not drive and operate hazardous machinery  · Do not make important personal or business decisions  · Do  not drink alcoholic beverages  · If you have not urinated within 8 hours after discharge, please contact your surgeon on call. Report the following to your surgeon:  · Excessive pain, swelling, redness or odor of or around the surgical area  · Temperature over 100.5  · Any nausea or vomiting. · You will receive a Post Operative Call from one of the Recovery Room Nurses on the day after your surgery to check on you. It is very important for us to know how you are recovering after your surgery. If you have an issue or need to speak with someone, please call your surgeon, do not wait for the post operative call. · You may receive an e-mail or letter in the mail from [facility] regarding your experience with us in the Ambulatory Surgery Unit. Your feedback is valuable to us and we appreciate your participation in the survey.        · If the above instructions are not adequate or you are having problems after your surgery, call the physician at their office number. · We wish you a speedy recovery ? What to do at Home:      *  Please give a list of your current medications to your Primary Care Provider. *  Please update this list whenever your medications are discontinued, doses are      changed, or new medications (including over-the-counter products) are added. *  Please carry medication information at all times in case of emergency situations. These are general instructions for a healthy lifestyle:    No smoking/ No tobacco products/ Avoid exposure to second hand smoke    Surgeon General's Warning:  Quitting smoking now greatly reduces serious risk to your health. Obesity, smoking, and sedentary lifestyle greatly increases your risk for illness    A healthy diet, regular physical exercise & weight monitoring are important for maintaining a healthy lifestyle    You may be retaining fluid if you have a history of heart failure or if you experience any of the following symptoms:  Weight gain of 3 pounds or more overnight or 5 pounds in a week, increased swelling in our hands or feet or shortness of breath while lying flat in bed. Please call your doctor as soon as you notice any of these symptoms; do not wait until your next office visit. Recognize signs and symptoms of STROKE:    B - Balance  E - Eyes    F-  Face looks uneven    A-  Arms unable to move or move even    S-  Speech slurred or non-existent    T-  Time-call 911 as soon as signs and symptoms begin-DO NOT go       Back to bed or wait to see if you get better-TIME IS BRAIN. If you have not received your influenza and/or pneumococcal vaccine, please follow up with your primary care physician. The discharge information has been reviewed with the patient and family. The patient and family verbalized understanding. TO PREVENT AN INFECTION      1.  [address] YOUR HANDS     To prevent infection, good handwashing is the most important thing you or your caregiver can do.  Wash your hands with soap and water or use the hand  we gave you before you touch any wounds. 2.  USE CLEAN SHEETS     Use freshly cleaned sheets on your bed after surgery.  To keep the surgery site clean, do not allow pets to sleep with you while your wound is still healing. 3. STOP SMOKING    Stop smoking, or at least cut back on smoking     Smoking slows your healing. 4.  CONTROL YOUR BLOOD SUGAR     High blood sugars slow wound healing.  If you are diabetic, control your blood sugar levels before and after your surgery.

## 2019-05-29 NOTE — OP NOTES
Name: Ricky Billings MASC-6       updated 3/1/2013  Description:  Complex Kelman phacoemulsification with use of intraoperative iris hooks. PREOPERATIVE DIAGNOSES:  1. Visually impairing combined cataract, Right eye.  2. 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 POSTOPERATIVE DIAGNOSES:   1. Visually impairing combined cataract, Right eye.  2. 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 OPERATION: Complex Kelman phacoemulsification with use of intraoperative iris hooks. Procedure(s):  CATARACT EXTRACTION WITH INTRA OCULAR LENS IMPLANT WITH IRIS HOOKS RIGHT 1ST    ANESTHESIA: Topical with intravenous sedation    TYPE OF LENS IMPLANT USED:   Implant Name Type Inv. Item Serial No.  Lot No. LRB No. Used Action   AcrySofIQ Toric Astigmatism Intraocular Lens 18.5D   [id number] [employer] VISION N/A Right 1 Implanted       PHACO TIME:  55.4 seconds at an average power of  14.1%. Estimated blood loss: None    Complications: None    Specimen removed: None    DESCRIPTION OF PROCEDURE: The patient was brought to the holding area where an IV was begun at a  keep open rate. The patient received several instillations of Cyclogyl 2% and Fox-Synephrine 2.5% and proparacaine 0.5% until best pupillary dilatation was achieved. The patient was connected to cardiovascular monitoring. The patient was then brought back to the operating suite and then prepped and draped in the usual manner for ocular surgery. Cardiovascular monitoring was reestablished. The patient received several instillations of Betadine in the inferior conjunctival cul-de-sac along with 4% Xylocaine. The operating microscope was brought into position and the lid speculum was set into position. A paracentesis was created and 1% preservative-free Xylocaine was instilled into the anterior chamber followed by Viscoelastic.  The Iris hooks were placed strategically at 2,4,8 and 10 o'clock to facilitate pupillary mydriasis. The 2.4 mm keratome was utilized to create the corneal incision. The capsulorrhexis was performed in a circular fashion. The hard nucleus of the lens was hydrodissected away from the capsule using BSS solution. Viscoelastic was then instilled into the anterior chamber to protect the corneal endothelium. Phacoemulsification was then carried out followed by irrigation and aspiration of any remaining cortical material. Following this, the sterile lens was removed from the sterile container and inserted into the lens injector. It was inserted into the eye without complications and positioned appropriately on the patient's visual/astigmatic axis. The iris hooks were removed without complications. The Viscoelastic was then removed from the posterior chamber as well as the anterior chamber of the eye, and Miochol was instilled posterior to the iris plane to facilitate pupillary miosis. A single interrupted 10-0 nylon suture was placed at any  sites that required it for secure would closure. Once anterior chamber stability was ensured, the patient received several instillations of Iopidine and pred forte followed by instillation of gentamycin ophthalmic ointment. The lid speculum was removed and the patient was patched with a semi-pressure dressing and shield and was brought to the recovery room in alert and stable and satisfactory postoperative condition. Instructions were given to the patients family for their immediate postoperative care, and the patient is to follow up with me in the office tomorrow.     [address] [place] [address]  5/29/2019

## 2019-05-29 NOTE — PERIOP NOTES
Permission received to review discharge instructions and discuss private health information with [facility]. Patient states that [facility] will be with them for at least 24 hours following today's procedure.

## 2019-05-30 PROBLEM — H21.81 INTRAOPERATIVE FLOPPY IRIS SYNDROME (IFIS): Chronic | Status: ACTIVE | Noted: 2019-05-30

## 2019-05-30 PROBLEM — H25.812 COMBINED FORMS OF AGE-RELATED CATARACT OF LEFT EYE: Status: ACTIVE | Noted: 2019-05-24

## 2019-05-31 RX ORDER — MOXIFLOXACIN 5 MG/ML
1 SOLUTION/ DROPS OPHTHALMIC 3 TIMES DAILY
COMMUNITY
End: 2019-06-19

## 2019-05-31 RX ORDER — PREDNISOLONE ACETATE 10 MG/ML
1 SUSPENSION/ DROPS OPHTHALMIC 4 TIMES DAILY
COMMUNITY
End: 2019-10-17

## 2019-05-31 RX ORDER — DICLOFENAC SODIUM 1 MG/ML
1 SOLUTION/ DROPS OPHTHALMIC 4 TIMES DAILY
COMMUNITY
End: 2019-10-17

## 2019-05-31 NOTE — PERIOP NOTES
[facility]  Ambulatory Surgery Unit  Pre-operative Instructions    Surgery/Procedure Date  Tuesday, June 4, 2019            Tentative Arrival Time TBD      1. On the day of your surgery/procedure, please report to the Ambulatory Surgery Unit Registration Desk and sign in at your designated time. The Ambulatory Surgery Unit is located in [facility] on the [facility] side of the [facility] across from the [address] [place] [address]. Please have all of your health insurance cards and a photo ID. 2. You must have someone with you to drive you home, as you should not drive a car for 24 hours following anesthesia. Please make arrangements for a responsible adult friend or family member to stay with you for at least the first 24 hours after your surgery. 3. Do not have anything to eat or drink (including water, gum, mints, coffee, juice) after 11:59 PM, Monday. This may not apply to medications prescribed by your physician. (Please note below the special instructions with medications to take the morning of surgery, if applicable.)    4. We recommend you do not drink any alcoholic beverages for 24 hours before and after your surgery. 5. Contact your surgeons office for instructions on the following medications: non-steroidal anti-inflammatory drugs (i.e. Advil, Aleve), vitamins, and supplements. (Some surgeons will want you to stop these medications prior to surgery and others may allow you to take them)   **If you are currently taking Plavix, Coumadin, Aspirin and/or other blood-thinning agents, contact your surgeon for instructions. ** Your surgeon will partner with the physician prescribing these medications to determine if it is safe to stop or if you need to continue taking. Please do not stop taking these medications without instructions from your surgeon.     6. In an effort to help prevent surgical site infection, we ask that you shower with an anti-bacterial soap (i.e. Dial/Safeguard, or the soap provided to you at your preadmission testing appointment) for 3 days prior to and on the morning of surgery, using a fresh towel after each shower. (Please begin this process with fresh bed linens.) Do not apply any lotions, powders, or deodorants after the shower on the day of your procedure. If applicable, please do not shave the operative site for 48 hours prior to surgery. 7. Wear comfortable clothes. Wear glasses instead of contacts. Do not bring any jewelry or money (other than copays or fees as instructed). Do not wear make-up, particularly mascara, the morning of your surgery. Do not wear nail polish, particularly if you are having foot /hand surgery. Wear your hair loose or down, no ponytails, buns, kathy pins or clips. All body piercings must be removed. 8. You should understand that if you do not follow these instructions your surgery may be cancelled. If your physical condition changes (i.e. fever, cold or flu) please contact your surgeon as soon as possible. 9. It is important that you be on time. If a situation occurs where you may be late, or if you have any questions or problems, please call [phone].    10. Your surgery time may be subject to change. You will receive a phone call the day prior to surgery to confirm your arrival time. 11. Pediatric patients: please bring a change of clothes, diapers, bottle/sippy cup, pacifier, etc.      Special Instructions: Take all medications and inhalers, as prescribed, on the morning of surgery with a sip of water. I understand a pre-operative phone call will be made to verify my surgery time. In the event that I am not available, I give permission for a message to be left on my answering service and/or with another person?       yes    Preop instructions reviewed  Pt verbalized understanding.      ___________________      ___________________      ________________  (Signature of Patient)          (Witness) (Date and Time)

## 2019-06-03 ENCOUNTER — ANESTHESIA EVENT (OUTPATIENT)
Dept: SURGERY | Age: 84
End: 2019-06-03
Payer: MEDICARE

## 2019-06-04 ENCOUNTER — HOSPITAL ENCOUNTER (OUTPATIENT)
Age: 84
Setting detail: OUTPATIENT SURGERY
Discharge: HOME | End: 2019-06-04
Attending: OPHTHALMOLOGY | Admitting: OPHTHALMOLOGY
Payer: MEDICARE

## 2019-06-04 ENCOUNTER — ANESTHESIA (OUTPATIENT)
Dept: SURGERY | Age: 84
End: 2019-06-04
Payer: MEDICARE

## 2019-06-04 VITALS
HEART RATE: 56 BPM | TEMPERATURE: 98.3 F | DIASTOLIC BLOOD PRESSURE: 84 MMHG | RESPIRATION RATE: 23 BRPM | BODY MASS INDEX: 27.92 KG/M2 | OXYGEN SATURATION: 96 % | HEIGHT: 70 IN | WEIGHT: 195 LBS | SYSTOLIC BLOOD PRESSURE: 164 MMHG

## 2019-06-04 PROCEDURE — 77030018846 HC SOL IRR STRL H20 ICUM -A: Performed by: OPHTHALMOLOGY

## 2019-06-04 PROCEDURE — 74011000250 HC RX REV CODE- 250

## 2019-06-04 PROCEDURE — 76060000061 HC AMB SURG ANES 0.5 TO 1 HR: Performed by: OPHTHALMOLOGY

## 2019-06-04 PROCEDURE — V2630 ANTER CHAMBER INTRAOCUL LENS: HCPCS | Performed by: OPHTHALMOLOGY

## 2019-06-04 PROCEDURE — 74011250636 HC RX REV CODE- 250/636: Performed by: OPHTHALMOLOGY

## 2019-06-04 PROCEDURE — 74011000250 HC RX REV CODE- 250: Performed by: OPHTHALMOLOGY

## 2019-06-04 PROCEDURE — V2787 ASTIGMATISM-CORRECT FUNCTION: HCPCS | Performed by: OPHTHALMOLOGY

## 2019-06-04 PROCEDURE — 74011250637 HC RX REV CODE- 250/637: Performed by: OPHTHALMOLOGY

## 2019-06-04 PROCEDURE — 74011250636 HC RX REV CODE- 250/636

## 2019-06-04 PROCEDURE — 76210000046 HC AMBSU PH II REC FIRST 0.5 HR: Performed by: OPHTHALMOLOGY

## 2019-06-04 PROCEDURE — 77030021352 HC CBL LD SYS DISP COVD -B: Performed by: OPHTHALMOLOGY

## 2019-06-04 PROCEDURE — 77030019981 HC RETRCTR IRIS DISP ALCN -B: Performed by: OPHTHALMOLOGY

## 2019-06-04 PROCEDURE — 76030000000 HC AMB SURG OR TIME 0.5 TO 1: Performed by: OPHTHALMOLOGY

## 2019-06-04 DEVICE — IMPLANTABLE DEVICE: Type: IMPLANTABLE DEVICE | Site: EYE | Status: FUNCTIONAL

## 2019-06-04 RX ORDER — CYCLOPENTOLATE HYDROCHLORIDE 20 MG/ML
1 SOLUTION/ DROPS OPHTHALMIC
Status: COMPLETED | OUTPATIENT
Start: 2019-06-04 | End: 2019-06-04

## 2019-06-04 RX ORDER — MIDAZOLAM HYDROCHLORIDE 1 MG/ML
INJECTION, SOLUTION INTRAMUSCULAR; INTRAVENOUS AS NEEDED
Status: DISCONTINUED | OUTPATIENT
Start: 2019-06-04 | End: 2019-06-04 | Stop reason: HOSPADM

## 2019-06-04 RX ORDER — LIDOCAINE HYDROCHLORIDE 10 MG/ML
1 INJECTION, SOLUTION EPIDURAL; INFILTRATION; INTRACAUDAL; PERINEURAL ONCE
Status: COMPLETED | OUTPATIENT
Start: 2019-06-04 | End: 2019-06-04

## 2019-06-04 RX ORDER — PREDNISOLONE ACETATE 10 MG/ML
1 SUSPENSION/ DROPS OPHTHALMIC 2 TIMES DAILY
Status: DISCONTINUED | OUTPATIENT
Start: 2019-06-04 | End: 2019-06-04 | Stop reason: HOSPADM

## 2019-06-04 RX ORDER — SODIUM CHLORIDE 9 MG/ML
25 INJECTION, SOLUTION INTRAVENOUS CONTINUOUS
Status: DISCONTINUED | OUTPATIENT
Start: 2019-06-04 | End: 2019-06-04 | Stop reason: HOSPADM

## 2019-06-04 RX ORDER — SODIUM CHLORIDE 0.9 % (FLUSH) 0.9 %
5-40 SYRINGE (ML) INJECTION AS NEEDED
Status: DISCONTINUED | OUTPATIENT
Start: 2019-06-04 | End: 2019-06-04 | Stop reason: HOSPADM

## 2019-06-04 RX ORDER — PROPARACAINE HYDROCHLORIDE 5 MG/ML
1 SOLUTION/ DROPS OPHTHALMIC
Status: COMPLETED | OUTPATIENT
Start: 2019-06-04 | End: 2019-06-04

## 2019-06-04 RX ORDER — FENTANYL CITRATE 50 UG/ML
25 INJECTION, SOLUTION INTRAMUSCULAR; INTRAVENOUS
Status: DISCONTINUED | OUTPATIENT
Start: 2019-06-04 | End: 2019-06-04 | Stop reason: HOSPADM

## 2019-06-04 RX ORDER — ERYTHROMYCIN 5 MG/G
OINTMENT OPHTHALMIC
Status: COMPLETED | OUTPATIENT
Start: 2019-06-04 | End: 2019-06-04

## 2019-06-04 RX ORDER — DIPHENHYDRAMINE HYDROCHLORIDE 50 MG/ML
12.5 INJECTION, SOLUTION INTRAMUSCULAR; INTRAVENOUS AS NEEDED
Status: DISCONTINUED | OUTPATIENT
Start: 2019-06-04 | End: 2019-06-04 | Stop reason: HOSPADM

## 2019-06-04 RX ORDER — SODIUM CHLORIDE, SODIUM LACTATE, POTASSIUM CHLORIDE, CALCIUM CHLORIDE 600; 310; 30; 20 MG/100ML; MG/100ML; MG/100ML; MG/100ML
25 INJECTION, SOLUTION INTRAVENOUS CONTINUOUS
Status: DISCONTINUED | OUTPATIENT
Start: 2019-06-04 | End: 2019-06-04 | Stop reason: HOSPADM

## 2019-06-04 RX ORDER — CYCLOPENTOLATE HYDROCHLORIDE 20 MG/ML
SOLUTION/ DROPS OPHTHALMIC
Status: COMPLETED
Start: 2019-06-04 | End: 2019-06-04

## 2019-06-04 RX ORDER — ONDANSETRON 2 MG/ML
4 INJECTION INTRAMUSCULAR; INTRAVENOUS AS NEEDED
Status: DISCONTINUED | OUTPATIENT
Start: 2019-06-04 | End: 2019-06-04 | Stop reason: HOSPADM

## 2019-06-04 RX ORDER — SODIUM CHLORIDE 0.9 % (FLUSH) 0.9 %
5-40 SYRINGE (ML) INJECTION EVERY 8 HOURS
Status: DISCONTINUED | OUTPATIENT
Start: 2019-06-04 | End: 2019-06-04 | Stop reason: HOSPADM

## 2019-06-04 RX ORDER — KETOROLAC TROMETHAMINE 5 MG/ML
1 SOLUTION OPHTHALMIC
Status: COMPLETED | OUTPATIENT
Start: 2019-06-04 | End: 2019-06-04

## 2019-06-04 RX ORDER — LIDOCAINE HYDROCHLORIDE 40 MG/ML
3 INJECTION, SOLUTION RETROBULBAR; TOPICAL ONCE
Status: COMPLETED | OUTPATIENT
Start: 2019-06-04 | End: 2019-06-04

## 2019-06-04 RX ORDER — KETOROLAC TROMETHAMINE 5 MG/ML
SOLUTION OPHTHALMIC
Status: COMPLETED
Start: 2019-06-04 | End: 2019-06-04

## 2019-06-04 RX ORDER — PHENYLEPHRINE HYDROCHLORIDE 25 MG/ML
1 SOLUTION/ DROPS OPHTHALMIC
Status: COMPLETED | OUTPATIENT
Start: 2019-06-04 | End: 2019-06-04

## 2019-06-04 RX ORDER — LIDOCAINE HYDROCHLORIDE 10 MG/ML
0.1 INJECTION, SOLUTION EPIDURAL; INFILTRATION; INTRACAUDAL; PERINEURAL AS NEEDED
Status: DISCONTINUED | OUTPATIENT
Start: 2019-06-04 | End: 2019-06-04 | Stop reason: HOSPADM

## 2019-06-04 RX ADMIN — PROPARACAINE HYDROCHLORIDE 1 DROP: 5 SOLUTION/ DROPS OPHTHALMIC at 12:52

## 2019-06-04 RX ADMIN — CYCLOPENTOLATE HYDROCHLORIDE 1 DROP: 20 SOLUTION/ DROPS OPHTHALMIC at 12:53

## 2019-06-04 RX ADMIN — PHENYLEPHRINE HYDROCHLORIDE 1 DROP: 25 SOLUTION/ DROPS OPHTHALMIC at 12:53

## 2019-06-04 RX ADMIN — MIDAZOLAM HYDROCHLORIDE 1 MG: 1 INJECTION, SOLUTION INTRAMUSCULAR; INTRAVENOUS at 14:19

## 2019-06-04 RX ADMIN — CYCLOPENTOLATE HYDROCHLORIDE 1 DROP: 20 SOLUTION/ DROPS OPHTHALMIC at 13:04

## 2019-06-04 RX ADMIN — KETOROLAC TROMETHAMINE 1 DROP: 5 SOLUTION OPHTHALMIC at 13:04

## 2019-06-04 RX ADMIN — KETOROLAC TROMETHAMINE 1 DROP: 5 SOLUTION OPHTHALMIC at 12:53

## 2019-06-04 RX ADMIN — PROPARACAINE HYDROCHLORIDE 1 DROP: 5 SOLUTION/ DROPS OPHTHALMIC at 13:04

## 2019-06-04 RX ADMIN — PHENYLEPHRINE HYDROCHLORIDE 1 DROP: 25 SOLUTION/ DROPS OPHTHALMIC at 13:04

## 2019-06-04 RX ADMIN — PROPARACAINE HYDROCHLORIDE 1 DROP: 5 SOLUTION/ DROPS OPHTHALMIC at 13:11

## 2019-06-04 RX ADMIN — CYCLOPENTOLATE HYDROCHLORIDE 1 DROP: 20 SOLUTION/ DROPS OPHTHALMIC at 13:09

## 2019-06-04 RX ADMIN — PHENYLEPHRINE HYDROCHLORIDE 1 DROP: 25 SOLUTION/ DROPS OPHTHALMIC at 12:59

## 2019-06-04 RX ADMIN — SODIUM CHLORIDE 25 ML/HR: 900 INJECTION, SOLUTION INTRAVENOUS at 12:44

## 2019-06-04 RX ADMIN — PROPARACAINE HYDROCHLORIDE 1 DROP: 5 SOLUTION/ DROPS OPHTHALMIC at 13:09

## 2019-06-04 RX ADMIN — KETOROLAC TROMETHAMINE 1 DROP: 5 SOLUTION/ DROPS OPHTHALMIC at 12:53

## 2019-06-04 RX ADMIN — CYCLOPENTOLATE HYDROCHLORIDE 1 DROP: 20 SOLUTION/ DROPS OPHTHALMIC at 12:59

## 2019-06-04 RX ADMIN — MIDAZOLAM HYDROCHLORIDE 0.5 MG: 1 INJECTION, SOLUTION INTRAMUSCULAR; INTRAVENOUS at 14:31

## 2019-06-04 RX ADMIN — PHENYLEPHRINE HYDROCHLORIDE 1 DROP: 25 SOLUTION/ DROPS OPHTHALMIC at 13:09

## 2019-06-04 RX ADMIN — PROPARACAINE HYDROCHLORIDE 1 DROP: 5 SOLUTION/ DROPS OPHTHALMIC at 12:59

## 2019-06-04 RX ADMIN — KETOROLAC TROMETHAMINE 1 DROP: 5 SOLUTION OPHTHALMIC at 13:09

## 2019-06-04 RX ADMIN — KETOROLAC TROMETHAMINE 1 DROP: 5 SOLUTION OPHTHALMIC at 12:59

## 2019-06-04 NOTE — OP NOTES
Name: Marge Millan MASC-6       updated 3/1/2013  Description:  Complex Kelman phacoemulsification with use of intraoperative iris hooks. PREOPERATIVE DIAGNOSES:  1. Visually impairing combined cataract, Left eye.  2. 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 POSTOPERATIVE DIAGNOSES:   1. Visually impairing combined cataract, Left eye.  2. 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 OPERATION: Complex Kelman phacoemulsification with use of intraoperative iris hooks. Procedure(s):  CATARACT EXTRACTION WITH TORIC INTRA OCULAR LENS IMPLANT WITH IRIS HOOKS LEFT 2ND    ANESTHESIA: Topical with intravenous sedation    TYPE OF LENS IMPLANT USED:   Implant Name Type Inv. Item Serial No.  Lot No. LRB No. Used Action   LENS ACRYSF IQ TOR4 ASTGM 20.0 -- ACRYSOF - [id number]  LENS ACRYSF IQ TOR4 ASTGM 20.0 -- ACRYSOF [id number] [employer] NA Left 1 Implanted       PHACO TIME:  47.5 seconds at an average power of  15.5%. Estimated blood loss: None    Complications: None    Specimen removed: None    DESCRIPTION OF PROCEDURE: The patient was brought to the holding area where an IV was begun at a  keep open rate. The patient received several instillations of Cyclogyl 2% and Fox-Synephrine 2.5% and proparacaine 0.5% until best pupillary dilatation was achieved. The patient was connected to cardiovascular monitoring. The patient was then brought back to the operating suite and then prepped and draped in the usual manner for ocular surgery. Cardiovascular monitoring was reestablished. The patient received several instillations of Betadine in the inferior conjunctival cul-de-sac along with 4% Xylocaine. The operating microscope was brought into position and the lid speculum was set into position. A paracentesis was created and 1% preservative-free Xylocaine was instilled into the anterior chamber followed by Viscoelastic.  The Iris hooks were placed strategically at 2,4,8 and 10 o'clock to facilitate pupillary mydriasis. The 2.4 mm keratome was utilized to create the corneal incision. The capsulorrhexis was performed in a circular fashion. The hard nucleus of the lens was hydrodissected away from the capsule using BSS solution. Viscoelastic was then instilled into the anterior chamber to protect the corneal endothelium. Phacoemulsification was then carried out followed by irrigation and aspiration of any remaining cortical material. Following this, the sterile lens was removed from the sterile container and inserted into the lens injector. It was inserted into the eye without complications and positioned appropriately on the patient's visual/astigmatic axis. The iris hooks were removed without complications. The Viscoelastic was then removed from the posterior chamber as well as the anterior chamber of the eye, and Miochol was instilled posterior to the iris plane to facilitate pupillary miosis. A single interrupted 10-0 nylon suture was placed at any  sites that required it for secure would closure. Once anterior chamber stability was ensured, the patient received several instillations of Iopidine and pred forte followed by instillation of gentamycin ophthalmic ointment. The lid speculum was removed and the patient was patched with a semi-pressure dressing and shield and was brought to the recovery room in alert and stable and satisfactory postoperative condition. Instructions were given to the patients family for their immediate postoperative care, and the patient is to follow up with me in the office tomorrow.     [address] [place] [address]  6/4/2019

## 2019-06-04 NOTE — PERIOP NOTES
Mabel Bumpers  1934  [id number]    Situation:  Verbal report given from:  Anna Hurd RN and Faith VINES  Procedure: Procedure(s):  CATARACT EXTRACTION WITH INTRA OCULAR LENS IMPLANT WITH IRIS HOOKS LEFT 2ND    Background:    Preoperative diagnosis: Combined forms of age-related cataract of LEFT EYE  Floppy iris syndrome [H21.81]    Postoperative diagnosis: Combined forms of age-related cataract of LEFT EYE  Floppy iris syndrome [H21.81]    :  Dr. Garrett Ordaz    Assistant(s): Circ-1: Mere Jones RN  Scrub Tech-1: Rashard Bermudez    Specimens: * No specimens in log *    Assessment:  Intra-procedure medications         Anesthesia gave intra-procedure sedation and medications, see anesthesia flow sheet     Intravenous fluids: LR@ KVO     Vital signs stable       Recommendation:    Permission to share finding with wife Georgie Model : yes

## 2019-06-04 NOTE — ANESTHESIA POSTPROCEDURE EVALUATION
Procedure(s):  CATARACT EXTRACTION WITH INTRA OCULAR LENS IMPLANT WITH IRIS HOOKS LEFT 2ND.     MAC    Anesthesia Post Evaluation      Multimodal analgesia: multimodal analgesia not used between 6 hours prior to anesthesia start to PACU discharge  Patient location during evaluation: bedside  Patient participation: complete - patient participated  Level of consciousness: awake and alert  Pain score: 0  Airway patency: patent  Anesthetic complications: no  Cardiovascular status: blood pressure returned to baseline and hypertensive  Respiratory status: acceptable  Hydration status: acceptable  Comments: Counseled to check in with Primary MD about BP running high   Post anesthesia nausea and vomiting:  none      Vitals Value Taken Time   /84 6/4/2019  3:00 PM   Temp     Pulse 56 6/4/2019  3:00 PM   Resp 23 6/4/2019  3:00 PM   SpO2 96 % 6/4/2019  3:00 PM

## 2019-06-04 NOTE — DISCHARGE INSTRUCTIONS
Wandalee Boxer, D.O., P.CMagali  [facility].  [phone]    Post-Operative Instructions for Cataract Surgery     Remove your eye shield and bandage at 12 noon the same day as surgery and start your eye drops. THROW AWAY THE GAUZE UNDER THE SHIELD.  Place the blue eye shield back on for one week while asleep, even if napping in the recliner. Use the tape included in your bag.  DO NOT RUB YOUR EYE EVER! DO NOT WIPE YOUR EYE! ONLY WIPE ON YOUR CHEEK!                DO NOT WEAR EYE MAKEUP FOR 1 WEEK!  You can shower starting tomorrow.  You may resume your previous diet.  If you use glaucoma drops in the operative eye, continue them as directed.  Common symptoms after surgery include a scratchy feeling, slight headache, red eye, and/or blurred vision. You may use Advil or Tylenol for any discomfort.  Avoid strenuous activities and driving until you see Dr. Conley Sandhoff tomorrow. Please use care when walking, your depth perception may be altered.  Bring your bag with your drops to your follow up appointment. Below are Instructions On How To Use Your Eye Drops. ON THE DAY OF SURGERY:     Use all three eye drops at 12 noon, 4pm, and 8pm.  Wait 10 minutes between drops. THE DAY AFTER SURGERY:     You will use the drops 4 times a day at 8am, 12 noon, 4pm, and 8pm.   Use the drops every day until bottles are empty. Vigamox (tan top) Put 1 drop in at 8am, 12 noon, 4pm, and 8pm.    Pred Acetate (pink top) Put 1 drop in at 8:10am, 12:10pm, 4:10pm, and 8:10pm. Shake before using. Ketorolac/Diclofenac Put 1 drop in at 8:20am, 12:20pm, 4:20pm, 8:20pm.    CONTINUE DROPS UNTIL ALL BOTTLES ARE EMPTY! Follow-up appointment tomorrow at Dr. Robbin Preston office:______8:45am______________    Please call the office at [phone] if you experience severe pain or nausea.      The following personal items collected during your admission are returned to you: Dental Appliance: Dental Appliances: None  Vision: Visual Aid: Glasses(with wife)  Hearing Aid: @FLOW  DISCHARGE SUMMARY from Nurse  (1341:LAST)@  Jewelry:    Clothing:    Other Valuables:    Valuables sent to safe:        PATIENT INSTRUCTIONS:    After General Anesthesia or Intravenous Sedation, for 24 hours or while taking prescription Narcotics:        Someone should be with you for the next 24 hours. For your own safety, a responsible adult must drive you home. · Limit your activities  · Recommended activity: Rest today, up with assistance today. Do not climb stairs or shower unattended for the next 24 hours. · Please start with a soft bland diet and advance as tolerated (no nausea) to regular diet. · If you have a sore throat you should try the following: fluids, warm salt water gargles, or throat lozenges. If it does not improve after several days please follow up with your primary physician. · Do not drive and operate hazardous machinery  · Do not make important personal or business decisions  · Do  not drink alcoholic beverages  · If you have not urinated within 8 hours after discharge, please contact your surgeon on call. Report the following to your surgeon:  · Excessive pain, swelling, redness or odor of or around the surgical area  · Temperature over 100.5  · Any nausea or vomiting. · You will receive a Post Operative Call from one of the Recovery Room Nurses on the day after your surgery to check on you. It is very important for us to know how you are recovering after your surgery. If you have an issue or need to speak with someone, please call your surgeon, do not wait for the post operative call. · You may receive an e-mail or letter in the mail from [facility] regarding your experience with us in the Ambulatory Surgery Unit. Your feedback is valuable to us and we appreciate your participation in the survey.        · If the above instructions are not adequate or you are having problems after your surgery, call the physician at their office number. · We wish you a speedy recovery ? What to do at Home:      *  Please give a list of your current medications to your Primary Care Provider. *  Please update this list whenever your medications are discontinued, doses are      changed, or new medications (including over-the-counter products) are added. *  Please carry medication information at all times in case of emergency situations. These are general instructions for a healthy lifestyle:    No smoking/ No tobacco products/ Avoid exposure to second hand smoke    Surgeon General's Warning:  Quitting smoking now greatly reduces serious risk to your health. Obesity, smoking, and sedentary lifestyle greatly increases your risk for illness    A healthy diet, regular physical exercise & weight monitoring are important for maintaining a healthy lifestyle    You may be retaining fluid if you have a history of heart failure or if you experience any of the following symptoms:  Weight gain of 3 pounds or more overnight or 5 pounds in a week, increased swelling in our hands or feet or shortness of breath while lying flat in bed. Please call your doctor as soon as you notice any of these symptoms; do not wait until your next office visit. Recognize signs and symptoms of STROKE:    B - Balance  E - Eyes    F-  Face looks uneven    A-  Arms unable to move or move even    S-  Speech slurred or non-existent    T-  Time-call 911 as soon as signs and symptoms begin-DO NOT go       Back to bed or wait to see if you get better-TIME IS BRAIN. If you have not received your influenza and/or pneumococcal vaccine, please follow up with your primary care physician. The discharge information has been reviewed with the patient and family. The patient and family verbalized understanding. TO PREVENT AN INFECTION      1.  [address] YOUR HANDS     To prevent infection, good handwashing is the most important thing you or your caregiver can do.  Wash your hands with soap and water or use the hand  we gave you before you touch any wounds. 2.  USE CLEAN SHEETS     Use freshly cleaned sheets on your bed after surgery.  To keep the surgery site clean, do not allow pets to sleep with you while your wound is still healing. 3. STOP SMOKING    Stop smoking, or at least cut back on smoking     Smoking slows your healing. 4.  CONTROL YOUR BLOOD SUGAR     High blood sugars slow wound healing.  If you are diabetic, control your blood sugar levels before and after your surgery.

## 2019-06-04 NOTE — PERIOP NOTES
Permission received to review discharge instructions and discuss private health information with wife, Mendel Jimenez. Patient states that wife will be with them for at least 24 hours following today's procedure.

## 2019-06-19 ENCOUNTER — OFFICE VISIT (OUTPATIENT)
Dept: DERMATOLOGY | Facility: AMBULATORY SURGERY CENTER | Age: 84
End: 2019-06-19

## 2019-06-19 VITALS
OXYGEN SATURATION: 96 % | BODY MASS INDEX: 27.92 KG/M2 | DIASTOLIC BLOOD PRESSURE: 80 MMHG | TEMPERATURE: 98.2 F | HEIGHT: 70 IN | RESPIRATION RATE: 18 BRPM | HEART RATE: 57 BPM | SYSTOLIC BLOOD PRESSURE: 160 MMHG | WEIGHT: 195 LBS

## 2019-06-19 DIAGNOSIS — L30.0 NUMMULAR DERMATITIS: Primary | ICD-10-CM

## 2019-06-19 DIAGNOSIS — Z85.828 HISTORY OF NONMELANOMA SKIN CANCER: ICD-10-CM

## 2019-06-19 DIAGNOSIS — L57.0 ACTINIC KERATOSIS: ICD-10-CM

## 2019-06-19 RX ORDER — TAMSULOSIN HYDROCHLORIDE 0.4 MG/1
CAPSULE ORAL
Refills: 0 | COMMUNITY
Start: 2019-03-26 | End: 2019-10-17

## 2019-06-19 RX ORDER — CLOBETASOL PROPIONATE 0.5 MG/G
CREAM TOPICAL 2 TIMES DAILY
Qty: 60 G | Refills: 2 | Status: SHIPPED | OUTPATIENT
Start: 2019-06-19

## 2019-06-19 NOTE — PROGRESS NOTES
Written by Ana Paula Potter, as dictated by Brigette Chavez, [address]. Name: Brown Lopez       Age: 80 y.o. Date: 2019    Chief Complaint:   Chief Complaint   Patient presents with    Skin Exam     left nose       Subjective:    HPI:  Mr.. Brown Lopez is a 80 y.o. male who presents for the evaluation of a lesion on the left nasal tip. He states that the lesion appeared 6 weeks ago. He reports that lesion opened up and bled x1 while he was in the shower. He is also concerned about two rashes, one on his posterior L lower leg has existed for \"a long time\" (months) and the other on his L arm has existed for 2 weeks. He states that the rash on his arm is \"itchy\". He reports that he just got a new leather chair and this may have caused the rash on the L leg. He denies family Hx of seasonal allergies. He is feeling well and in his usual state of health today. He has no current illnesses, no other skin concerns. His allergies, medications, medical, and social history are reviewed by me today. The patient's pertinent skin history includes:  -SCC, L nasal supratip, 11/19/15  -BCC, L nasal tip, Mohs, 2/23/15    ROS: Consitutional: Negative  Dermatological : positive for - skin lesion changes      Social History     Socioeconomic History    Marital status:      Spouse name: Not on file    Number of children: Not on file    Years of education: Not on file    Highest education level: Not on file   Occupational History    Not on file   Social Needs    Financial resource strain: Not on file    Food insecurity:     Worry: Not on file     Inability: Not on file    Transportation needs:     Medical: Not on file     Non-medical: Not on file   Tobacco Use    Smoking status: Former Smoker     Last attempt to quit: 1980     Years since quittin.4    Smokeless tobacco: Never Used   Substance and Sexual Activity    Alcohol use:  Yes     Alcohol/week: 4.2 oz     Types: 7 Shots of liquor per week    Drug use: Never    Sexual activity: Not on file   Lifestyle    Physical activity:     Days per week: Not on file     Minutes per session: Not on file    Stress: Not on file   Relationships    Social connections:     Talks on phone: Not on file     Gets together: Not on file     Attends Yazidi service: Not on file     Active member of club or organization: Not on file     Attends meetings of clubs or organizations: Not on file     Relationship status: Not on file    Intimate partner violence:     Fear of current or ex partner: Not on file     Emotionally abused: Not on file     Physically abused: Not on file     Forced sexual activity: Not on file   Other Topics Concern    Not on file   Social History Narrative    Not on file       Family History   Problem Relation Age of Onset    Heart Disease Father     Heart Attack Father     Liver Disease Mother        Past Medical History:   Diagnosis Date    Arthritis     bilateral wrist, hands, hips, and knees    BPH (benign prostatic hyperplasia)     Compression fracture of thoracic vertebra ([address]     T3    ED (erectile dysfunction)     GERD (gastroesophageal reflux disease)     Hiatal hernia     Hypersomnia     tested for JOAN, does NOT have JOAN as stated 5/28/2019    Osteopenia     Schatzki's ring     Skin cancer     Sun-damaged skin     Sunburn, blistering     Tanning bed exposure        Past Surgical History:   Procedure Laterality Date    AMB POC MOHS 1 STAGE H/N/HF/G      HX CATARACT REMOVAL Right 05/29/2019    complex with iris hooks    HX CATARACT REMOVAL      HX COLONOSCOPY      HX ENDOSCOPY      HX MOHS PROCEDURES      HX PROSTATE SURGERY      TURP    HX TONSILLECTOMY      HX TONSILLECTOMY      HX TURP      HX UVULOPALATOPHARYNGOPLASTY         Current Outpatient Medications   Medication Sig Dispense Refill    clobetasol (TEMOVATE) 0.05 % topical cream Apply  to affected area two (2) times a day.  60 g 2    prednisoLONE acetate (PRED FORTE) 1 % ophthalmic suspension Administer 1 Drop to right eye four (4) times daily.  diclofenac (VOLTAREN) 0.1 % ophthalmic solution Administer 1 Drop to right eye four (4) times daily.  RAPAFLO 8 mg capsule Take 8 mg by mouth daily (with breakfast). 0    tamsulosin (FLOMAX) 0.4 mg capsule TAKE 1 CAPSULE BY MOUTH 30 MINUTES AFTER DINNER  0       Allergies   Allergen Reactions    Bactrim [Sulfamethoprim Ds] Rash         Objective:    Visit Vitals  /80 (BP 1 Location: Left arm, BP Patient Position: Sitting)   Pulse (!) 57   Temp 98.2 °F (36.8 °C) (Oral)   SpO2 96%       Sherlyn Stable is a 80 y.o. male who appears well and in no distress. He is awake, alert, and oriented. A limited skin examination was completed including the L leg. L arm, and the nose. He has thin scaled actinic keratoses on the nose x 3- L nasal tip, mid nasal tip, R nasal sidewall, and one on the R cheek. The rash of his concern is a  pink scaly annular confluent macular rash on the R arm and leg, most consistent with nummular dermatitis. He has two well healed scars on the mid and left nasal tip without clinical or dermoscopy findings for recurrence. Assessment/Plan:  1. Actinic Keratoses. The diagnosis of this precancerous lesion related to sun exposure was reviewed. Verbal consent was obtained. I treated 4 lesions with cryotherapy and post-cryotherapy care was reviewed. 2. Nummular Dermatitis. Patient was prescribed Clobetasol to apply to the area BID for 2-3 weeks and then prn rash. 3. Bleeding left nasal tip. Although I can not see evidence of recurrence, we discussed that if he has another event of bleeding we will need to biopsy to ensure there is not a new or recurrent skin cancer in the area. They agreed to monitor closely and call with questions/ recurrence of bleeding. 4.Personal history of skin cancer.   I discussed sun protection, sunscreen use, the warning signs of skin cancer, the need for self-skin examinations, and the need for regular practitioner exams every 1 year. The patient should follow up sooner as needed if new, changing, or symptomatic skin lesions arise. This plan was reviewed with the patient and patient agrees. All questions were answered. This scribe documentation was reviewed by me and accurately reflects the examination and decisions made by me. [facility]   OFFICE PROCEDURE PROGRESS NOTE   Chart reviewed for the following:   Placido ADAMS, have reviewed the History, Physical and updated the Allergic reactions for Joey Hilts. TIME OUT performed immediately prior to start of procedure:   Jia ADAMS, have performed the following reviews on Joey Hilts   prior to the start of the procedure:     * Patient was identified by name and date of birth   * Agreement on procedure being performed was verified   * Risks and Benefits explained to the patient   * Procedure site verified and marked as necessary   * Patient was positioned for comfort   * Consent was signed and verified     Time: 1:30PM  Date of procedure: 6/19/2019  Procedure performed by: Moisés Schafer.  Babs Bravo DNP  Provider assisted by: self   Patient assisted by: self   How tolerated by patient: tolerated the procedure well with no complications   Comments: none

## 2019-06-19 NOTE — PROGRESS NOTES
Identified pt with two pt identifiers(name and ). Reviewed record in preparation for visit and have obtained necessary documentation. All patient medications has been reviewed. Chief Complaint   Patient presents with    Skin Exam     left nose       Health Maintenance Due   Topic    DTaP/Tdap/Td series (1 - Tdap)    Shingrix Vaccine Age 50> (1 of 2)    GLAUCOMA SCREENING Q2Y     Pneumococcal 65+ years (1 of 2 - PCV13)    MEDICARE YEARLY EXAM        Vitals:    19 1331   BP: 160/80   Pulse: (!) 57   Resp: 18   Temp: 98.2 °F (36.8 °C)   TempSrc: Oral   SpO2: 96%   Weight: 88.5 kg (195 lb)   Height: 5' 10\" (1.778 m)       Coordination of Care Questionnaire:   1) Have you been to an emergency room, urgent care, or hospitalized since your last visit? yes     19 cataract surgery [facility]  2. Have seen or consulted any other health care provider since your last visit? NO    3) Do you have an Advanced Directive/ Living Will in place? NO  If yes, do we have a copy on file NO  If no, would you like information NO    Patient is accompanied by wife I have received verbal consent from Bishop Berry to discuss any/all medical information while they are present in the room.

## 2019-10-17 RX ORDER — FINASTERIDE 5 MG/1
5 TABLET, FILM COATED ORAL DAILY
COMMUNITY

## 2019-10-17 NOTE — PERIOP NOTES
[facility]  Ambulatory Surgery Unit  Pre-operative Instructions    Procedure Date  Wednesday, October 23, 2019             Tentative Arrival Time 0630      1. On the day of your procedure, please report to the Ambulatory Surgery Unit Registration Desk and sign in at your designated time. The Ambulatory Surgery Unit is located in [facility] on the Counts include 234 beds at the [facility] side of the [facility] across from the [address] [place] [address]. Please have all of your health insurance cards and a photo ID. 2. You must have someone with you to drive you home as directed by your surgeon. 3. You may have a light breakfast and take normal morning medications. 4. We recommend you do not drink any alcoholic beverages for 24 hours before and after your procedure. 5. Contact your surgeons office for instructions on the following medications: non-steroidal anti-inflammatory drugs (i.e. Advil, Aleve), vitamins, and supplements. (Some surgeons will want you to stop these medications prior to surgery and others may allow you to take them)   **If you are currently taking Plavix, Coumadin, Aspirin and/or other blood-thinning agents, contact your surgeon for instructions. ** Your surgeon will partner with the physician prescribing these medications to determine if it is safe to stop or if you need to continue taking. Please do not stop taking these medications without instructions from your surgeon. 6. In an effort to help prevent surgical site infection, we ask that you shower with an anti-bacterial soap (i.e. Dial or Safeguard) on the morning of your procedure. Do not apply any lotions, powders, or deodorants after showering. 7. Wear comfortable clothes. Wear glasses instead of contacts. Do not bring any jewelry or money (other than copays or fees as instructed). Do not wear make-up, particularly mascara, the morning of your procedure. Wear your hair loose or down, no ponytails, buns, kathy pins or clips.  All body piercings must be removed. 8. You should understand that if you do not follow these instructions your procedure may be cancelled. If your physical condition changes (i.e. fever, cold or flu) please contact your surgeon as soon as possible. 9. It is important that you be on time. If a situation occurs where you may be late, or if you have any questions or problems, please call [phone].    10. Your procedure time may be subject to change. You will receive a phone call the day prior to confirm your arrival time. I understand a pre-operative phone call will be made to verify my procedure time. In the event that I am not available, I give permission for a message to be left on my answering service and/or with another person?       yes    Preop instructions reviewed  Pt verbalized understanding.      ___________________      ___________________      ___________________  (Signature of Patient)          (Witness)                   (Date and Time)

## 2019-10-22 PROBLEM — H26.492 POSTERIOR CAPSULAR OPACIFICATION VISUALLY SIGNIFICANT OF LEFT EYE: Status: ACTIVE | Noted: 2019-10-22

## 2019-10-22 PROBLEM — H21.81 INTRAOPERATIVE FLOPPY IRIS SYNDROME (IFIS): Chronic | Status: RESOLVED | Noted: 2019-05-30 | Resolved: 2019-10-22

## 2019-10-22 PROBLEM — H25.812 COMBINED FORMS OF AGE-RELATED CATARACT OF LEFT EYE: Status: RESOLVED | Noted: 2019-05-24 | Resolved: 2019-10-22

## 2019-10-23 ENCOUNTER — HOSPITAL ENCOUNTER (OUTPATIENT)
Age: 84
Setting detail: OUTPATIENT SURGERY
Discharge: HOME | End: 2019-10-23
Attending: OPHTHALMOLOGY | Admitting: OPHTHALMOLOGY
Payer: MEDICARE

## 2019-10-23 VITALS
SYSTOLIC BLOOD PRESSURE: 149 MMHG | HEIGHT: 70 IN | TEMPERATURE: 97.5 F | BODY MASS INDEX: 27.92 KG/M2 | OXYGEN SATURATION: 96 % | RESPIRATION RATE: 18 BRPM | HEART RATE: 64 BPM | WEIGHT: 195 LBS | DIASTOLIC BLOOD PRESSURE: 90 MMHG

## 2019-10-23 PROCEDURE — 74011250637 HC RX REV CODE- 250/637: Performed by: OPHTHALMOLOGY

## 2019-10-23 PROCEDURE — 74011000250 HC RX REV CODE- 250

## 2019-10-23 PROCEDURE — 74011000250 HC RX REV CODE- 250: Performed by: OPHTHALMOLOGY

## 2019-10-23 PROCEDURE — 76030000017 HC AMB SURG FIRST 0.5 HR INTENSV-TIER 1: Performed by: OPHTHALMOLOGY

## 2019-10-23 RX ORDER — PROPARACAINE HYDROCHLORIDE 5 MG/ML
1 SOLUTION/ DROPS OPHTHALMIC
Status: COMPLETED | OUTPATIENT
Start: 2019-10-23 | End: 2019-10-23

## 2019-10-23 RX ORDER — TROPICAMIDE 10 MG/ML
1 SOLUTION/ DROPS OPHTHALMIC
Status: DISCONTINUED | OUTPATIENT
Start: 2019-10-23 | End: 2019-10-23 | Stop reason: HOSPADM

## 2019-10-23 RX ORDER — PHENYLEPHRINE HYDROCHLORIDE 25 MG/ML
1 SOLUTION/ DROPS OPHTHALMIC
Status: DISCONTINUED | OUTPATIENT
Start: 2019-10-23 | End: 2019-10-23 | Stop reason: HOSPADM

## 2019-10-23 RX ORDER — TROPICAMIDE 10 MG/ML
SOLUTION/ DROPS OPHTHALMIC
Status: COMPLETED
Start: 2019-10-23 | End: 2019-10-23

## 2019-10-23 RX ADMIN — PHENYLEPHRINE HYDROCHLORIDE 1 DROP: 25 SOLUTION/ DROPS OPHTHALMIC at 07:13

## 2019-10-23 RX ADMIN — FLUOROMETHOLONE 1 DROP: 2.5 SUSPENSION/ DROPS OPHTHALMIC at 07:39

## 2019-10-23 RX ADMIN — TROPICAMIDE 1 DROP: 10 SOLUTION/ DROPS OPHTHALMIC at 07:13

## 2019-10-23 RX ADMIN — PROPARACAINE HYDROCHLORIDE 1 DROP: 5 SOLUTION/ DROPS OPHTHALMIC at 07:35

## 2019-10-23 RX ADMIN — HYPROMELLOSE 2910 (4000 MPA.S) 1 DROP: 25 SOLUTION/ DROPS OPHTHALMIC at 07:37

## 2019-10-23 RX ADMIN — PHENYLEPHRINE HYDROCHLORIDE 1 DROP: 25 SOLUTION/ DROPS OPHTHALMIC at 07:04

## 2019-10-23 RX ADMIN — TROPICAMIDE 1 DROP: 10 SOLUTION/ DROPS OPHTHALMIC at 07:04

## 2019-10-23 RX ADMIN — PHENYLEPHRINE HYDROCHLORIDE 1 DROP: 25 SOLUTION/ DROPS OPHTHALMIC at 07:07

## 2019-10-23 RX ADMIN — PROPARACAINE HYDROCHLORIDE 1 DROP: 5 SOLUTION/ DROPS OPHTHALMIC at 07:07

## 2019-10-23 RX ADMIN — PROPARACAINE HYDROCHLORIDE 1 DROP: 5 SOLUTION/ DROPS OPHTHALMIC at 07:04

## 2019-10-23 RX ADMIN — TROPICAMIDE 1 DROP: 10 SOLUTION/ DROPS OPHTHALMIC at 07:07

## 2019-10-23 RX ADMIN — PROPARACAINE HYDROCHLORIDE 1 DROP: 5 SOLUTION/ DROPS OPHTHALMIC at 07:13

## 2019-10-23 RX ADMIN — BALANCED SALT SOLUTION 20 DROP: 6.4; .75; .48; .3; 3.9; 1.7 SOLUTION OPHTHALMIC at 07:38

## 2022-03-19 PROBLEM — H26.492 POSTERIOR CAPSULAR OPACIFICATION VISUALLY SIGNIFICANT OF LEFT EYE: Status: ACTIVE | Noted: 2019-10-22

## 2025-06-24 ENCOUNTER — HOSPITAL ENCOUNTER (OUTPATIENT)
Facility: HOSPITAL | Age: 89
Discharge: HOME | End: 2025-06-27
Attending: UROLOGY
Payer: MEDICARE

## 2025-06-24 DIAGNOSIS — R31.0 GROSS HEMATURIA: ICD-10-CM

## 2025-06-24 LAB — CREAT BLD-MCNC: 1.4 MG/DL (ref 0.6–1.3)

## 2025-06-24 PROCEDURE — 74178 CT ABD&PLV WO CNTR FLWD CNTR: CPT

## 2025-06-24 PROCEDURE — 82565 ASSAY OF CREATININE: CPT

## 2025-06-24 PROCEDURE — 6360000004 HC RX CONTRAST MEDICATION: Performed by: UROLOGY

## 2025-06-24 RX ORDER — IOPAMIDOL 612 MG/ML
100 INJECTION, SOLUTION INTRAVASCULAR
Status: COMPLETED | OUTPATIENT
Start: 2025-06-24 | End: 2025-06-24

## 2025-06-24 RX ADMIN — IOPAMIDOL 100 ML: 612 INJECTION, SOLUTION INTRAVENOUS at 15:51

## (undated) DEVICE — SOLUTION IV STRL H2O 500 ML AQUALITE POUR BTL

## (undated) DEVICE — SYRINGE INSULIN 1ML LUERSLIP TIP W/O SFTY U100 BLISTER PK

## (undated) DEVICE — THE MONARCH® "D" CARTRIDGE IS A SINGLE-USE POLYPROPYLENE CARTRIDGE FOR POSTERIOR CHAMBER IOL DELIVERY: Brand: MONARCH® III

## (undated) DEVICE — Device

## (undated) DEVICE — SYR 3ML LL TIP 1/10ML GRAD --

## (undated) DEVICE — NDL FLTR TIP 5 MIC 18GX1.5IN --

## (undated) DEVICE — HIGH FLOW RATE EXTENSION SET, LUER LOCK ADAPTERS

## (undated) DEVICE — SURGICAL PROCEDURE PACK CATRCT CUST

## (undated) DEVICE — SYR 5ML 1/5 GRAD LL NSAF LF --

## (undated) DEVICE — CONTINU-FLO SOLUTION SET, 2 INJECTION SITES, MALE LUER LOCK ADAPTER WITH RETRACTABLE COLLAR, LARGE BORE STOPCOCK WITH ROTATING MALE LUER LOCK EXTENSION SET, 2 INJECTION SITES, MALE LUER LOCK ADAPTER WITH RETRACTABLE COLLAR: Brand: INTERLINK/CONTINU-FLO

## (undated) DEVICE — SOLUTION IV 250ML 0.9% SOD CHL CLR INJ FLX BG CONT PRT CLSR

## (undated) DEVICE — SYR 10ML LUER LOK 1/5ML GRAD --

## (undated) DEVICE — STERILE POLYISOPRENE POWDER-FREE SURGICAL GLOVES: Brand: PROTEXIS

## (undated) DEVICE — KENDALL DL ECG CABLE AND LEAD WIRE SYSTEM, 3-LEAD, SINGLE PATIENT USE: Brand: KENDALL

## (undated) DEVICE — 3M™ TEGADERM™ TRANSPARENT FILM DRESSING FRAME STYLE, 1624W, 2-3/8 IN X 2-3/4 IN (6 CM X 7 CM), 100/CT 4CT/CASE: Brand: 3M™ TEGADERM™

## (undated) DEVICE — FLEXIBLE IRIS RETRACTORS, 6 UNITS AT 5 HOOKS: Brand: ALCON GRIESHABER